# Patient Record
Sex: MALE | Race: WHITE | Employment: OTHER
[De-identification: names, ages, dates, MRNs, and addresses within clinical notes are randomized per-mention and may not be internally consistent; named-entity substitution may affect disease eponyms.]

---

## 2017-05-03 ENCOUNTER — SURGERY (OUTPATIENT)
Age: 72
End: 2017-05-03

## 2017-05-03 ENCOUNTER — HOSPITAL ENCOUNTER (OUTPATIENT)
Facility: HOSPITAL | Age: 72
Setting detail: HOSPITAL OUTPATIENT SURGERY
Discharge: HOME | End: 2017-05-03
Attending: INTERNAL MEDICINE | Admitting: INTERNAL MEDICINE
Payer: MEDICARE

## 2017-05-03 VITALS
TEMPERATURE: 98 F | OXYGEN SATURATION: 98 % | RESPIRATION RATE: 16 BRPM | HEART RATE: 60 BPM | DIASTOLIC BLOOD PRESSURE: 60 MMHG | SYSTOLIC BLOOD PRESSURE: 124 MMHG

## 2017-05-03 DIAGNOSIS — Z86.010 PERSONAL HISTORY OF COLONIC POLYPS: ICD-10-CM

## 2017-05-03 PROCEDURE — 0DBP8ZX EXCISION OF RECTUM, VIA NATURAL OR ARTIFICIAL OPENING ENDOSCOPIC, DIAGNOSTIC: ICD-10-PCS | Performed by: INTERNAL MEDICINE

## 2017-05-03 PROCEDURE — 0DBH8ZX EXCISION OF CECUM, VIA NATURAL OR ARTIFICIAL OPENING ENDOSCOPIC, DIAGNOSTIC: ICD-10-PCS | Performed by: INTERNAL MEDICINE

## 2017-05-03 PROCEDURE — 0DBN8ZX EXCISION OF SIGMOID COLON, VIA NATURAL OR ARTIFICIAL OPENING ENDOSCOPIC, DIAGNOSTIC: ICD-10-PCS | Performed by: INTERNAL MEDICINE

## 2017-05-03 PROCEDURE — 88305 TISSUE EXAM BY PATHOLOGIST: CPT | Performed by: INTERNAL MEDICINE

## 2017-05-03 RX ORDER — MIDAZOLAM HYDROCHLORIDE 1 MG/ML
INJECTION INTRAMUSCULAR; INTRAVENOUS
Status: DISCONTINUED | OUTPATIENT
Start: 2017-05-03 | End: 2017-05-03

## 2017-05-03 RX ORDER — SODIUM CHLORIDE, SODIUM LACTATE, POTASSIUM CHLORIDE, CALCIUM CHLORIDE 600; 310; 30; 20 MG/100ML; MG/100ML; MG/100ML; MG/100ML
INJECTION, SOLUTION INTRAVENOUS CONTINUOUS
Status: DISCONTINUED | OUTPATIENT
Start: 2017-05-03 | End: 2017-05-03

## 2017-05-03 NOTE — H&P
[facility]  Pre-procedure History and Physical      Vivian Dye Patient Status:  Hospital Outpatient Surgery    1945 MRN [id number]   [facility] ENDOSCOPY Attending Dragan Bello MD   Hosp Day #  PCP Billie Blank MD       5/3

## 2017-05-03 NOTE — OPERATIVE REPORT
[facility]  Colonoscopy Report      Celso Ek Patient Status:  Hospital Outpatient Surgery    1945 MRN [id number]   [facility] ENDOSCOPY Attending Gabi Rosa MD       DATE OF OPERATION: 5/3/2017     PREOPERATIVE DIAGNOSIS

## 2018-06-28 PROCEDURE — 81001 URINALYSIS AUTO W/SCOPE: CPT | Performed by: INTERNAL MEDICINE

## 2018-07-05 PROCEDURE — 81003 URINALYSIS AUTO W/O SCOPE: CPT | Performed by: INTERNAL MEDICINE

## 2018-07-20 PROBLEM — Z87.440 HISTORY OF UTI: Status: ACTIVE | Noted: 2018-07-20

## 2018-07-20 PROBLEM — R73.01 IFG (IMPAIRED FASTING GLUCOSE): Status: ACTIVE | Noted: 2018-07-20

## 2018-08-09 PROBLEM — I70.0 AORTIC ATHEROSCLEROSIS (HCC): Chronic | Status: ACTIVE | Noted: 2018-08-09

## 2018-08-09 PROBLEM — I70.0 AORTIC ATHEROSCLEROSIS (HCC): Status: ACTIVE | Noted: 2018-08-09

## 2018-08-20 PROCEDURE — 81003 URINALYSIS AUTO W/O SCOPE: CPT | Performed by: INTERNAL MEDICINE

## 2020-07-27 PROBLEM — N18.30 CKD (CHRONIC KIDNEY DISEASE) STAGE 3, GFR 30-59 ML/MIN (HCC): Status: ACTIVE | Noted: 2020-07-27

## 2021-08-11 PROBLEM — L60.0 ONYCHOCRYPTOSIS: Status: ACTIVE | Noted: 2020-07-16

## 2022-09-28 ENCOUNTER — OFFICE VISIT (OUTPATIENT)
Facility: LOCATION | Age: 77
End: 2022-09-28

## 2022-09-28 DIAGNOSIS — H61.23 BILATERAL IMPACTED CERUMEN: Primary | ICD-10-CM

## 2022-09-28 PROCEDURE — 92504 EAR MICROSCOPY EXAMINATION: CPT | Performed by: OTOLARYNGOLOGY

## 2022-09-28 PROCEDURE — 99214 OFFICE O/P EST MOD 30 MIN: CPT | Performed by: OTOLARYNGOLOGY

## 2023-01-01 ENCOUNTER — APPOINTMENT (OUTPATIENT)
Dept: CT IMAGING | Facility: HOSPITAL | Age: 78
DRG: 871 | End: 2023-01-01
Attending: EMERGENCY MEDICINE

## 2023-01-01 ENCOUNTER — HOSPITAL ENCOUNTER (INPATIENT)
Facility: HOSPITAL | Age: 78
LOS: 1 days | DRG: 871 | End: 2023-01-01
Attending: EMERGENCY MEDICINE | Admitting: INTERNAL MEDICINE

## 2023-01-01 ENCOUNTER — LAB ENCOUNTER (OUTPATIENT)
Dept: LAB | Facility: HOSPITAL | Age: 78
DRG: 371 | End: 2023-01-01
Attending: CLINICAL NURSE SPECIALIST

## 2023-01-01 ENCOUNTER — HOSPITAL ENCOUNTER (INPATIENT)
Facility: HOSPITAL | Age: 78
LOS: 6 days | Discharge: HOME | DRG: 371 | End: 2023-01-01
Attending: EMERGENCY MEDICINE | Admitting: INTERNAL MEDICINE

## 2023-01-01 ENCOUNTER — APPOINTMENT (OUTPATIENT)
Dept: GENERAL RADIOLOGY | Facility: HOSPITAL | Age: 78
DRG: 871 | End: 2023-01-01
Attending: EMERGENCY MEDICINE

## 2023-01-01 ENCOUNTER — TELEPHONE (OUTPATIENT)
Dept: HEMATOLOGY/ONCOLOGY | Facility: HOSPITAL | Age: 78
End: 2023-01-01

## 2023-01-01 ENCOUNTER — APPOINTMENT (OUTPATIENT)
Dept: HEMATOLOGY/ONCOLOGY | Facility: HOSPITAL | Age: 78
End: 2023-01-01
Attending: INTERNAL MEDICINE
Payer: MEDICARE

## 2023-01-01 ENCOUNTER — HOSPITAL ENCOUNTER (OUTPATIENT)
Dept: GENERAL RADIOLOGY | Facility: HOSPITAL | Age: 78
Discharge: HOME | End: 2023-01-01
Attending: CLINICAL NURSE SPECIALIST
Payer: MEDICARE

## 2023-01-01 VITALS
SYSTOLIC BLOOD PRESSURE: 118 MMHG | RESPIRATION RATE: 18 BRPM | WEIGHT: 209.88 LBS | TEMPERATURE: 98 F | HEART RATE: 79 BPM | OXYGEN SATURATION: 96 % | DIASTOLIC BLOOD PRESSURE: 51 MMHG | BODY MASS INDEX: 29 KG/M2

## 2023-01-01 VITALS
SYSTOLIC BLOOD PRESSURE: 116 MMHG | WEIGHT: 185.19 LBS | OXYGEN SATURATION: 50 % | HEIGHT: 74 IN | BODY MASS INDEX: 23.77 KG/M2 | DIASTOLIC BLOOD PRESSURE: 88 MMHG | TEMPERATURE: 96 F

## 2023-01-01 DIAGNOSIS — E83.51 HYPOCALCEMIA: ICD-10-CM

## 2023-01-01 DIAGNOSIS — E87.20 METABOLIC ACIDEMIA: ICD-10-CM

## 2023-01-01 DIAGNOSIS — R10.84 GENERALIZED ABDOMINAL PAIN: ICD-10-CM

## 2023-01-01 DIAGNOSIS — R10.31 RIGHT LOWER QUADRANT ABDOMINAL PAIN: ICD-10-CM

## 2023-01-01 DIAGNOSIS — A04.72 CLOSTRIDIUM DIFFICILE DIARRHEA: Primary | ICD-10-CM

## 2023-01-01 DIAGNOSIS — N17.9 ACUTE RENAL FAILURE SUPERIMPOSED ON CHRONIC KIDNEY DISEASE, UNSPECIFIED ACUTE RENAL FAILURE TYPE, UNSPECIFIED CKD STAGE: Primary | ICD-10-CM

## 2023-01-01 DIAGNOSIS — R19.7 DIARRHEA, UNSPECIFIED TYPE: ICD-10-CM

## 2023-01-01 DIAGNOSIS — C25.9 MALIGNANT NEOPLASM OF PANCREAS, UNSPECIFIED LOCATION OF MALIGNANCY (HCC): ICD-10-CM

## 2023-01-01 DIAGNOSIS — C25.2 MALIGNANT NEOPLASM OF TAIL OF PANCREAS (HCC): ICD-10-CM

## 2023-01-01 DIAGNOSIS — A04.72 CLOSTRIDIUM DIFFICILE COLITIS: ICD-10-CM

## 2023-01-01 DIAGNOSIS — R53.1 WEAKNESS GENERALIZED: ICD-10-CM

## 2023-01-01 DIAGNOSIS — N18.9 ACUTE RENAL FAILURE SUPERIMPOSED ON CHRONIC KIDNEY DISEASE, UNSPECIFIED ACUTE RENAL FAILURE TYPE, UNSPECIFIED CKD STAGE: Primary | ICD-10-CM

## 2023-01-01 LAB
ALBUMIN SERPL-MCNC: 1.6 G/DL (ref 3.4–5)
ALBUMIN SERPL-MCNC: 2 G/DL (ref 3.4–5)
ALBUMIN SERPL-MCNC: 2 G/DL (ref 3.4–5)
ALBUMIN SERPL-MCNC: 2.5 G/DL (ref 3.4–5)
ALBUMIN/GLOB SERPL: 0.5 {RATIO} (ref 1–2)
ALBUMIN/GLOB SERPL: 0.6 {RATIO} (ref 1–2)
ALBUMIN/GLOB SERPL: 0.7 {RATIO} (ref 1–2)
ALP LIVER SERPL-CCNC: 134 U/L
ALP LIVER SERPL-CCNC: 75 U/L
ALP LIVER SERPL-CCNC: 99 U/L
ALT SERPL-CCNC: 126 U/L
ALT SERPL-CCNC: 37 U/L
ALT SERPL-CCNC: 42 U/L
ANION GAP SERPL CALC-SCNC: 18 MMOL/L (ref 0–18)
ANION GAP SERPL CALC-SCNC: 21 MMOL/L (ref 0–18)
ANION GAP SERPL CALC-SCNC: 5 MMOL/L (ref 0–18)
ANION GAP SERPL CALC-SCNC: 5 MMOL/L (ref 0–18)
ANION GAP SERPL CALC-SCNC: 7 MMOL/L (ref 0–18)
ANION GAP SERPL CALC-SCNC: 8 MMOL/L (ref 0–18)
ANION GAP SERPL CALC-SCNC: 9 MMOL/L (ref 0–18)
ANION GAP SERPL CALC-SCNC: 9 MMOL/L (ref 0–18)
APTT PPP: 43.7 SECONDS (ref 23.3–35.6)
ARTERIAL PATENCY WRIST A: POSITIVE
ARTERIAL PATENCY WRIST A: POSITIVE
AST SERPL-CCNC: 137 U/L (ref 15–37)
AST SERPL-CCNC: 23 U/L (ref 15–37)
AST SERPL-CCNC: 34 U/L (ref 15–37)
ATRIAL RATE: 103 BPM
BASE EXCESS BLDA CALC-SCNC: -22.4 MMOL/L (ref ?–2)
BASE EXCESS BLDA CALC-SCNC: -22.6 MMOL/L (ref ?–2)
BASOPHILS # BLD AUTO: 0.01 X10(3) UL (ref 0–0.2)
BASOPHILS # BLD AUTO: 0.01 X10(3) UL (ref 0–0.2)
BASOPHILS # BLD AUTO: 0.02 X10(3) UL (ref 0–0.2)
BASOPHILS # BLD: 0 X10(3) UL (ref 0–0.2)
BASOPHILS # BLD: 0.16 X10(3) UL (ref 0–0.2)
BASOPHILS NFR BLD AUTO: 0.6 %
BASOPHILS NFR BLD AUTO: 1.5 %
BASOPHILS NFR BLD AUTO: 2 %
BASOPHILS NFR BLD: 0 %
BASOPHILS NFR BLD: 1 %
BILIRUB SERPL-MCNC: 0.8 MG/DL (ref 0.1–2)
BILIRUB SERPL-MCNC: 1.2 MG/DL (ref 0.1–2)
BILIRUB SERPL-MCNC: 1.3 MG/DL (ref 0.1–2)
BILIRUB UR QL CFM: NEGATIVE
BILIRUB UR QL STRIP.AUTO: NEGATIVE
BODY TEMPERATURE: 98.6 F
BODY TEMPERATURE: 98.6 F
BUN BLD-MCNC: 18 MG/DL (ref 7–18)
BUN BLD-MCNC: 22 MG/DL (ref 7–18)
BUN BLD-MCNC: 24 MG/DL (ref 7–18)
BUN BLD-MCNC: 24 MG/DL (ref 7–18)
BUN BLD-MCNC: 25 MG/DL (ref 7–18)
BUN BLD-MCNC: 27 MG/DL (ref 7–18)
BUN BLD-MCNC: 44 MG/DL (ref 7–18)
BUN BLD-MCNC: 47 MG/DL (ref 7–18)
C DIFF TOX B STL QL: POSITIVE
CA-I BLD-SCNC: 0.97 MMOL/L (ref 0.95–1.32)
CA-I BLD-SCNC: 1.04 MMOL/L (ref 0.95–1.32)
CALCIUM BLD-MCNC: 7.1 MG/DL (ref 8.5–10.1)
CALCIUM BLD-MCNC: 7.4 MG/DL (ref 8.5–10.1)
CALCIUM BLD-MCNC: 7.5 MG/DL (ref 8.5–10.1)
CALCIUM BLD-MCNC: 7.5 MG/DL (ref 8.5–10.1)
CALCIUM BLD-MCNC: 7.7 MG/DL (ref 8.5–10.1)
CALCIUM BLD-MCNC: 7.7 MG/DL (ref 8.5–10.1)
CALCIUM BLD-MCNC: 7.9 MG/DL (ref 8.5–10.1)
CALCIUM BLD-MCNC: 8.3 MG/DL (ref 8.5–10.1)
CHLORIDE SERPL-SCNC: 107 MMOL/L (ref 98–112)
CHLORIDE SERPL-SCNC: 109 MMOL/L (ref 98–112)
CHLORIDE SERPL-SCNC: 111 MMOL/L (ref 98–112)
CHLORIDE SERPL-SCNC: 112 MMOL/L (ref 98–112)
CHLORIDE SERPL-SCNC: 113 MMOL/L (ref 98–112)
CHLORIDE SERPL-SCNC: 114 MMOL/L (ref 98–112)
CHLORIDE SERPL-SCNC: 114 MMOL/L (ref 98–112)
CHLORIDE SERPL-SCNC: 115 MMOL/L (ref 98–112)
CLARITY UR REFRACT.AUTO: CLEAR
CO2 SERPL-SCNC: 18 MMOL/L (ref 21–32)
CO2 SERPL-SCNC: 20 MMOL/L (ref 21–32)
CO2 SERPL-SCNC: 20 MMOL/L (ref 21–32)
CO2 SERPL-SCNC: 21 MMOL/L (ref 21–32)
CO2 SERPL-SCNC: 21 MMOL/L (ref 21–32)
CO2 SERPL-SCNC: 24 MMOL/L (ref 21–32)
CO2 SERPL-SCNC: 7 MMOL/L (ref 21–32)
CO2 SERPL-SCNC: 9 MMOL/L (ref 21–32)
COHGB MFR BLD: 1.3 % SAT (ref 0–3)
COHGB MFR BLD: 1.4 % SAT (ref 0–3)
COLOR UR AUTO: YELLOW
COLOR UR AUTO: YELLOW
CREAT BLD-MCNC: 0.93 MG/DL
CREAT BLD-MCNC: 1.12 MG/DL
CREAT BLD-MCNC: 1.13 MG/DL
CREAT BLD-MCNC: 1.17 MG/DL
CREAT BLD-MCNC: 1.27 MG/DL
CREAT BLD-MCNC: 1.31 MG/DL
CREAT BLD-MCNC: 3.68 MG/DL
CREAT BLD-MCNC: 3.73 MG/DL
CREAT UR-SCNC: 220 MG/DL
DOHLE BOD BLD QL SMEAR: PRESENT
DOHLE BOD BLD QL SMEAR: PRESENT
EGFRCR SERPLBLD CKD-EPI 2021: 16 ML/MIN/1.73M2 (ref 60–?)
EGFRCR SERPLBLD CKD-EPI 2021: 16 ML/MIN/1.73M2 (ref 60–?)
EGFRCR SERPLBLD CKD-EPI 2021: 55 ML/MIN/1.73M2 (ref 60–?)
EGFRCR SERPLBLD CKD-EPI 2021: 57 ML/MIN/1.73M2 (ref 60–?)
EGFRCR SERPLBLD CKD-EPI 2021: 63 ML/MIN/1.73M2 (ref 60–?)
EGFRCR SERPLBLD CKD-EPI 2021: 66 ML/MIN/1.73M2 (ref 60–?)
EGFRCR SERPLBLD CKD-EPI 2021: 66 ML/MIN/1.73M2 (ref 60–?)
EGFRCR SERPLBLD CKD-EPI 2021: 83 ML/MIN/1.73M2 (ref 60–?)
EOSINOPHIL # BLD AUTO: 0.01 X10(3) UL (ref 0–0.7)
EOSINOPHIL # BLD AUTO: 0.03 X10(3) UL (ref 0–0.7)
EOSINOPHIL # BLD AUTO: 0.04 X10(3) UL (ref 0–0.7)
EOSINOPHIL # BLD: 0 X10(3) UL (ref 0–0.7)
EOSINOPHIL # BLD: 0.26 X10(3) UL (ref 0–0.7)
EOSINOPHIL # BLD: 0.32 X10(3) UL (ref 0–0.7)
EOSINOPHIL # BLD: 0.33 X10(3) UL (ref 0–0.7)
EOSINOPHIL NFR BLD AUTO: 0.6 %
EOSINOPHIL NFR BLD AUTO: 3 %
EOSINOPHIL NFR BLD AUTO: 6.2 %
EOSINOPHIL NFR BLD: 0 %
EOSINOPHIL NFR BLD: 1 %
EOSINOPHIL NFR BLD: 2 %
EOSINOPHIL NFR BLD: 6 %
ERYTHROCYTE [DISTWIDTH] IN BLOOD BY AUTOMATED COUNT: 14.1 %
ERYTHROCYTE [DISTWIDTH] IN BLOOD BY AUTOMATED COUNT: 14.6 %
ERYTHROCYTE [DISTWIDTH] IN BLOOD BY AUTOMATED COUNT: 14.9 %
ERYTHROCYTE [DISTWIDTH] IN BLOOD BY AUTOMATED COUNT: 15.2 %
ERYTHROCYTE [DISTWIDTH] IN BLOOD BY AUTOMATED COUNT: 15.7 %
ERYTHROCYTE [DISTWIDTH] IN BLOOD BY AUTOMATED COUNT: 15.9 %
ERYTHROCYTE [DISTWIDTH] IN BLOOD BY AUTOMATED COUNT: 19.4 %
FIO2: 32 %
FLUAV + FLUBV RNA SPEC NAA+PROBE: NEGATIVE
FLUAV + FLUBV RNA SPEC NAA+PROBE: NEGATIVE
GLOBULIN PLAS-MCNC: 3.2 G/DL (ref 2.8–4.4)
GLOBULIN PLAS-MCNC: 3.8 G/DL (ref 2.8–4.4)
GLOBULIN PLAS-MCNC: 4 G/DL (ref 2.8–4.4)
GLUCOSE BLD-MCNC: 102 MG/DL (ref 70–99)
GLUCOSE BLD-MCNC: 103 MG/DL (ref 70–99)
GLUCOSE BLD-MCNC: 106 MG/DL (ref 70–99)
GLUCOSE BLD-MCNC: 106 MG/DL (ref 70–99)
GLUCOSE BLD-MCNC: 107 MG/DL (ref 70–99)
GLUCOSE BLD-MCNC: 107 MG/DL (ref 70–99)
GLUCOSE BLD-MCNC: 109 MG/DL (ref 70–99)
GLUCOSE BLD-MCNC: 109 MG/DL (ref 70–99)
GLUCOSE BLD-MCNC: 110 MG/DL (ref 70–99)
GLUCOSE BLD-MCNC: 111 MG/DL (ref 70–99)
GLUCOSE BLD-MCNC: 112 MG/DL (ref 70–99)
GLUCOSE BLD-MCNC: 112 MG/DL (ref 70–99)
GLUCOSE BLD-MCNC: 114 MG/DL (ref 70–99)
GLUCOSE BLD-MCNC: 116 MG/DL (ref 70–99)
GLUCOSE BLD-MCNC: 120 MG/DL (ref 70–99)
GLUCOSE BLD-MCNC: 123 MG/DL (ref 70–99)
GLUCOSE BLD-MCNC: 126 MG/DL (ref 70–99)
GLUCOSE BLD-MCNC: 127 MG/DL (ref 70–99)
GLUCOSE BLD-MCNC: 128 MG/DL (ref 70–99)
GLUCOSE BLD-MCNC: 130 MG/DL (ref 70–99)
GLUCOSE BLD-MCNC: 136 MG/DL (ref 70–99)
GLUCOSE BLD-MCNC: 136 MG/DL (ref 70–99)
GLUCOSE BLD-MCNC: 138 MG/DL (ref 70–99)
GLUCOSE BLD-MCNC: 139 MG/DL (ref 70–99)
GLUCOSE BLD-MCNC: 141 MG/DL (ref 70–99)
GLUCOSE BLD-MCNC: 141 MG/DL (ref 70–99)
GLUCOSE BLD-MCNC: 144 MG/DL (ref 70–99)
GLUCOSE BLD-MCNC: 147 MG/DL (ref 70–99)
GLUCOSE BLD-MCNC: 148 MG/DL (ref 70–99)
GLUCOSE BLD-MCNC: 155 MG/DL (ref 70–99)
GLUCOSE BLD-MCNC: 65 MG/DL (ref 70–99)
GLUCOSE BLD-MCNC: 66 MG/DL (ref 70–99)
GLUCOSE BLD-MCNC: 67 MG/DL (ref 70–99)
GLUCOSE BLD-MCNC: 89 MG/DL (ref 70–99)
GLUCOSE BLD-MCNC: 96 MG/DL (ref 70–99)
GLUCOSE BLD-MCNC: 96 MG/DL (ref 70–99)
GLUCOSE UR STRIP.AUTO-MCNC: NORMAL MG/DL
GLUCOSE UR STRIP.AUTO-MCNC: NORMAL MG/DL
HCO3 BLDA-SCNC: 7.2 MEQ/L (ref 21–27)
HCO3 BLDA-SCNC: 7.4 MEQ/L (ref 21–27)
HCT VFR BLD AUTO: 26.3 %
HCT VFR BLD AUTO: 26.3 %
HCT VFR BLD AUTO: 26.5 %
HCT VFR BLD AUTO: 26.7 %
HCT VFR BLD AUTO: 27.3 %
HCT VFR BLD AUTO: 33.8 %
HCT VFR BLD AUTO: 36.2 %
HGB BLD-MCNC: 11.6 G/DL
HGB BLD-MCNC: 11.6 G/DL
HGB BLD-MCNC: 11.7 G/DL
HGB BLD-MCNC: 9.3 G/DL
HGB BLD-MCNC: 9.4 G/DL
HGB BLD-MCNC: 9.4 G/DL
HGB BLD-MCNC: 9.5 G/DL
HGB BLD-MCNC: 9.6 G/DL
HGB BLD-MCNC: 9.7 G/DL
HGBA1C: 6.2 %
IMM GRANULOCYTES # BLD AUTO: 0.01 X10(3) UL (ref 0–1)
IMM GRANULOCYTES # BLD AUTO: 0.01 X10(3) UL (ref 0–1)
IMM GRANULOCYTES # BLD AUTO: 0.02 X10(3) UL (ref 0–1)
IMM GRANULOCYTES NFR BLD: 1 %
IMM GRANULOCYTES NFR BLD: 1.1 %
IMM GRANULOCYTES NFR BLD: 1.5 %
INR BLD: 1.74 (ref 0.85–1.16)
KETONES UR STRIP.AUTO-MCNC: NEGATIVE MG/DL
KETONES UR STRIP.AUTO-MCNC: NEGATIVE MG/DL
L/M: 3 L/MIN
L/M: 3 L/MIN
LACTATE BLD-SCNC: 11.5 MMOL/L (ref 0.5–2)
LACTATE BLD-SCNC: 9.9 MMOL/L (ref 0.5–2)
LACTATE SERPL-SCNC: 10.6 MMOL/L (ref 0.4–2)
LACTATE SERPL-SCNC: 12.6 MMOL/L (ref 0.4–2)
LACTATE SERPL-SCNC: 9.1 MMOL/L (ref 0.4–2)
LEUKOCYTE ESTERASE UR QL STRIP.AUTO: NEGATIVE
LEUKOCYTE ESTERASE UR QL STRIP.AUTO: NEGATIVE
LIPASE SERPL-CCNC: 21 U/L (ref 13–75)
LYMPHOCYTES # BLD AUTO: 0.19 X10(3) UL (ref 1–4)
LYMPHOCYTES # BLD AUTO: 0.35 X10(3) UL (ref 1–4)
LYMPHOCYTES # BLD AUTO: 0.37 X10(3) UL (ref 1–4)
LYMPHOCYTES NFR BLD AUTO: 21.1 %
LYMPHOCYTES NFR BLD AUTO: 29.2 %
LYMPHOCYTES NFR BLD AUTO: 35.4 %
LYMPHOCYTES NFR BLD: 0.99 X10(3) UL (ref 1–4)
LYMPHOCYTES NFR BLD: 1.57 X10(3) UL (ref 1–4)
LYMPHOCYTES NFR BLD: 1.77 X10(3) UL (ref 1–4)
LYMPHOCYTES NFR BLD: 10 %
LYMPHOCYTES NFR BLD: 11 %
LYMPHOCYTES NFR BLD: 18 %
LYMPHOCYTES NFR BLD: 2.32 X10(3) UL (ref 1–4)
LYMPHOCYTES NFR BLD: 6 %
MAGNESIUM SERPL-MCNC: 1.8 MG/DL (ref 1.6–2.6)
MAGNESIUM SERPL-MCNC: 2 MG/DL (ref 1.6–2.6)
MCH RBC QN AUTO: 32.6 PG (ref 26–34)
MCH RBC QN AUTO: 32.8 PG (ref 26–34)
MCH RBC QN AUTO: 32.9 PG (ref 26–34)
MCH RBC QN AUTO: 33 PG (ref 26–34)
MCH RBC QN AUTO: 33.4 PG (ref 26–34)
MCHC RBC AUTO-ENTMCNC: 32 G/DL (ref 31–37)
MCHC RBC AUTO-ENTMCNC: 34.4 G/DL (ref 31–37)
MCHC RBC AUTO-ENTMCNC: 34.6 G/DL (ref 31–37)
MCHC RBC AUTO-ENTMCNC: 35.4 G/DL (ref 31–37)
MCHC RBC AUTO-ENTMCNC: 35.5 G/DL (ref 31–37)
MCHC RBC AUTO-ENTMCNC: 36 G/DL (ref 31–37)
MCHC RBC AUTO-ENTMCNC: 36.1 G/DL (ref 31–37)
MCV RBC AUTO: 101.7 FL
MCV RBC AUTO: 90.4 FL
MCV RBC AUTO: 92 FL
MCV RBC AUTO: 93 FL
MCV RBC AUTO: 93.3 FL
MCV RBC AUTO: 94.7 FL
MCV RBC AUTO: 95.5 FL
METAMYELOCYTES # BLD: 0.17 X10(3) UL
METAMYELOCYTES # BLD: 0.46 X10(3) UL
METAMYELOCYTES # BLD: 0.97 X10(3) UL
METAMYELOCYTES # BLD: 1.31 X10(3) UL
METAMYELOCYTES NFR BLD: 2 %
METAMYELOCYTES NFR BLD: 3 %
METAMYELOCYTES NFR BLD: 5 %
METAMYELOCYTES NFR BLD: 6 %
METHGB MFR BLD: 0 % SAT (ref 0.4–1.5)
METHGB MFR BLD: 0 % SAT (ref 0.4–1.5)
MONOCYTES # BLD AUTO: 0.05 X10(3) UL (ref 0.1–1)
MONOCYTES # BLD AUTO: 0.18 X10(3) UL (ref 0.1–1)
MONOCYTES # BLD AUTO: 0.54 X10(3) UL (ref 0.1–1)
MONOCYTES # BLD: 0.77 X10(3) UL (ref 0.1–1)
MONOCYTES # BLD: 0.93 X10(3) UL (ref 0.1–1)
MONOCYTES # BLD: 1.04 X10(3) UL (ref 0.1–1)
MONOCYTES # BLD: 1.13 X10(3) UL (ref 0.1–1)
MONOCYTES NFR BLD AUTO: 27.7 %
MONOCYTES NFR BLD AUTO: 30.9 %
MONOCYTES NFR BLD AUTO: 5.1 %
MONOCYTES NFR BLD: 14 %
MONOCYTES NFR BLD: 4 %
MONOCYTES NFR BLD: 4 %
MONOCYTES NFR BLD: 7 %
MORPHOLOGY: NORMAL
MYELOCYTES # BLD: 0.11 X10(3) UL
MYELOCYTES # BLD: 0.48 X10(3) UL
MYELOCYTES # BLD: 0.52 X10(3) UL
MYELOCYTES # BLD: 0.7 X10(3) UL
MYELOCYTES NFR BLD: 2 %
MYELOCYTES NFR BLD: 2 %
MYELOCYTES NFR BLD: 3 %
MYELOCYTES NFR BLD: 3 %
NEUTROPHILS # BLD AUTO: 0.22 X10 (3) UL (ref 1.5–7.7)
NEUTROPHILS # BLD AUTO: 0.22 X10(3) UL (ref 1.5–7.7)
NEUTROPHILS # BLD AUTO: 0.53 X10 (3) UL (ref 1.5–7.7)
NEUTROPHILS # BLD AUTO: 0.53 X10(3) UL (ref 1.5–7.7)
NEUTROPHILS # BLD AUTO: 0.8 X10 (3) UL (ref 1.5–7.7)
NEUTROPHILS # BLD AUTO: 0.8 X10(3) UL (ref 1.5–7.7)
NEUTROPHILS # BLD AUTO: 10.76 X10 (3) UL (ref 1.5–7.7)
NEUTROPHILS # BLD AUTO: 17.88 X10 (3) UL (ref 1.5–7.7)
NEUTROPHILS # BLD AUTO: 19.07 X10 (3) UL (ref 1.5–7.7)
NEUTROPHILS # BLD AUTO: 3.45 X10 (3) UL (ref 1.5–7.7)
NEUTROPHILS NFR BLD AUTO: 33.9 %
NEUTROPHILS NFR BLD AUTO: 45.7 %
NEUTROPHILS NFR BLD AUTO: 53.5 %
NEUTROPHILS NFR BLD: 23 %
NEUTROPHILS NFR BLD: 43 %
NEUTROPHILS NFR BLD: 57 %
NEUTROPHILS NFR BLD: 68 %
NEUTS BAND NFR BLD: 13 %
NEUTS BAND NFR BLD: 14 %
NEUTS BAND NFR BLD: 14 %
NEUTS BAND NFR BLD: 58 %
NEUTS HYPERSEG # BLD: 11.27 X10(3) UL (ref 1.5–7.7)
NEUTS HYPERSEG # BLD: 18.79 X10(3) UL (ref 1.5–7.7)
NEUTS HYPERSEG # BLD: 21.4 X10(3) UL (ref 1.5–7.7)
NEUTS HYPERSEG # BLD: 3.14 X10(3) UL (ref 1.5–7.7)
NITRITE UR QL STRIP.AUTO: NEGATIVE
NITRITE UR QL STRIP.AUTO: NEGATIVE
NRBC BLD MANUAL-RTO: 1 %
NRBC BLD MANUAL-RTO: 3 %
NRBC BLD MANUAL-RTO: 4 %
OSMOLALITY SERPL CALC.SUM OF ELEC: 288 MOSM/KG (ref 275–295)
OSMOLALITY SERPL CALC.SUM OF ELEC: 293 MOSM/KG (ref 275–295)
OSMOLALITY SERPL CALC.SUM OF ELEC: 293 MOSM/KG (ref 275–295)
OSMOLALITY SERPL CALC.SUM OF ELEC: 294 MOSM/KG (ref 275–295)
OSMOLALITY SERPL CALC.SUM OF ELEC: 295 MOSM/KG (ref 275–295)
OSMOLALITY SERPL CALC.SUM OF ELEC: 296 MOSM/KG (ref 275–295)
OSMOLALITY SERPL CALC.SUM OF ELEC: 299 MOSM/KG (ref 275–295)
OSMOLALITY SERPL CALC.SUM OF ELEC: 303 MOSM/KG (ref 275–295)
OXYHGB MFR BLDA: 90.5 % (ref 92–100)
OXYHGB MFR BLDA: 93.3 % (ref 92–100)
P AXIS: 22 DEGREES
P-R INTERVAL: 156 MS
PCO2 BLDA: 17 MM HG (ref 35–45)
PCO2 BLDA: 25 MM HG (ref 35–45)
PH BLDA: 7.03 [PH] (ref 7.35–7.45)
PH BLDA: 7.1 [PH] (ref 7.35–7.45)
PH UR STRIP.AUTO: 5.5 [PH] (ref 5–8)
PH UR STRIP.AUTO: 5.5 [PH] (ref 5–8)
PHOSPHATE SERPL-MCNC: 10.5 MG/DL (ref 2.5–4.9)
PLATELET # BLD AUTO: 103 10(3)UL (ref 150–450)
PLATELET # BLD AUTO: 123 10(3)UL (ref 150–450)
PLATELET # BLD AUTO: 210 10(3)UL (ref 150–450)
PLATELET # BLD AUTO: 69 10(3)UL (ref 150–450)
PLATELET # BLD AUTO: 69 10(3)UL (ref 150–450)
PLATELET # BLD AUTO: 78 10(3)UL (ref 150–450)
PLATELET # BLD AUTO: 94 10(3)UL (ref 150–450)
PLATELET MORPHOLOGY: NORMAL
PO2 BLDA: 66 MM HG (ref 80–100)
PO2 BLDA: 81 MM HG (ref 80–100)
POTASSIUM BLD-SCNC: 5.1 MMOL/L (ref 3.6–5.1)
POTASSIUM BLD-SCNC: 5.2 MMOL/L (ref 3.6–5.1)
POTASSIUM SERPL-SCNC: 3 MMOL/L (ref 3.5–5.1)
POTASSIUM SERPL-SCNC: 3.1 MMOL/L (ref 3.5–5.1)
POTASSIUM SERPL-SCNC: 3.1 MMOL/L (ref 3.5–5.1)
POTASSIUM SERPL-SCNC: 3.4 MMOL/L (ref 3.5–5.1)
POTASSIUM SERPL-SCNC: 3.4 MMOL/L (ref 3.5–5.1)
POTASSIUM SERPL-SCNC: 3.6 MMOL/L (ref 3.5–5.1)
POTASSIUM SERPL-SCNC: 4.6 MMOL/L (ref 3.5–5.1)
POTASSIUM SERPL-SCNC: 5.5 MMOL/L (ref 3.5–5.1)
PROT SERPL-MCNC: 5.2 G/DL (ref 6.4–8.2)
PROT SERPL-MCNC: 6 G/DL (ref 6.4–8.2)
PROT SERPL-MCNC: 6.3 G/DL (ref 6.4–8.2)
PROT UR STRIP.AUTO-MCNC: 70 MG/DL
PROT UR STRIP.AUTO-MCNC: 70 MG/DL
PROTHROMBIN TIME: 20.5 SECONDS (ref 11.6–14.8)
Q-T INTERVAL: 324 MS
QRS DURATION: 76 MS
QTC CALCULATION (BEZET): 424 MS
R AXIS: 40 DEGREES
RBC # BLD AUTO: 2.82 X10(6)UL
RBC # BLD AUTO: 2.86 X10(6)UL
RBC # BLD AUTO: 2.87 X10(6)UL
RBC # BLD AUTO: 2.88 X10(6)UL
RBC # BLD AUTO: 2.91 X10(6)UL
RBC # BLD AUTO: 3.56 X10(6)UL
RBC # BLD AUTO: 3.57 X10(6)UL
RBC UR QL AUTO: NEGATIVE
RBC UR QL AUTO: NEGATIVE
RSV RNA SPEC NAA+PROBE: NEGATIVE
SARS-COV-2 RNA RESP QL NAA+PROBE: NOT DETECTED
SARS-COV-2 RNA RESP QL NAA+PROBE: NOT DETECTED
SODIUM BLD-SCNC: 134 MMOL/L (ref 135–145)
SODIUM BLD-SCNC: 137 MMOL/L (ref 135–145)
SODIUM SERPL-SCNC: 10 MMOL/L
SODIUM SERPL-SCNC: 136 MMOL/L (ref 136–145)
SODIUM SERPL-SCNC: 139 MMOL/L (ref 136–145)
SODIUM SERPL-SCNC: 140 MMOL/L (ref 136–145)
SODIUM SERPL-SCNC: 141 MMOL/L (ref 136–145)
SODIUM SERPL-SCNC: 141 MMOL/L (ref 136–145)
SP GR UR STRIP.AUTO: 1.03 (ref 1–1.03)
SP GR UR STRIP.AUTO: 1.03 (ref 1–1.03)
T AXIS: 50 DEGREES
TOTAL CELLS COUNTED BLD: 100
UROBILINOGEN UR STRIP.AUTO-MCNC: 2 MG/DL
UROBILINOGEN UR STRIP.AUTO-MCNC: NORMAL MG/DL
VENTRICULAR RATE: 103 BPM
WBC # BLD AUTO: 0.7 X10(3) UL (ref 4–11)
WBC # BLD AUTO: 1 X10(3) UL (ref 4–11)
WBC # BLD AUTO: 1.8 X10(3) UL (ref 4–11)
WBC # BLD AUTO: 16.1 X10(3) UL (ref 4–11)
WBC # BLD AUTO: 23.2 X10(3) UL (ref 4–11)
WBC # BLD AUTO: 26.1 X10(3) UL (ref 4–11)
WBC # BLD AUTO: 5.5 X10(3) UL (ref 4–11)
YEAST UR QL: PRESENT /HPF

## 2023-01-01 PROCEDURE — 87046 STOOL CULTR AEROBIC BACT EA: CPT

## 2023-01-01 PROCEDURE — 99232 SBSQ HOSP IP/OBS MODERATE 35: CPT | Performed by: INTERNAL MEDICINE

## 2023-01-01 PROCEDURE — 74176 CT ABD & PELVIS W/O CONTRAST: CPT | Performed by: EMERGENCY MEDICINE

## 2023-01-01 PROCEDURE — 71045 X-RAY EXAM CHEST 1 VIEW: CPT | Performed by: EMERGENCY MEDICINE

## 2023-01-01 PROCEDURE — 87045 FECES CULTURE AEROBIC BACT: CPT

## 2023-01-01 PROCEDURE — 99223 1ST HOSP IP/OBS HIGH 75: CPT | Performed by: SPECIALIST

## 2023-01-01 PROCEDURE — 5A09357 ASSISTANCE WITH RESPIRATORY VENTILATION, LESS THAN 24 CONSECUTIVE HOURS, CONTINUOUS POSITIVE AIRWAY PRESSURE: ICD-10-PCS | Performed by: INTERNAL MEDICINE

## 2023-01-01 PROCEDURE — 87427 SHIGA-LIKE TOXIN AG IA: CPT

## 2023-01-01 PROCEDURE — 87493 C DIFF AMPLIFIED PROBE: CPT

## 2023-01-01 PROCEDURE — 74019 RADEX ABDOMEN 2 VIEWS: CPT | Performed by: CLINICAL NURSE SPECIALIST

## 2023-01-01 PROCEDURE — 99291 CRITICAL CARE FIRST HOUR: CPT | Performed by: INTERNAL MEDICINE

## 2023-01-01 PROCEDURE — 99223 1ST HOSP IP/OBS HIGH 75: CPT | Performed by: INTERNAL MEDICINE

## 2023-01-01 PROCEDURE — 99223 1ST HOSP IP/OBS HIGH 75: CPT | Performed by: NURSE PRACTITIONER

## 2023-01-01 RX ORDER — ACETAMINOPHEN 325 MG/1
650 TABLET ORAL EVERY 6 HOURS PRN
Status: DISCONTINUED | OUTPATIENT
Start: 2023-01-01 | End: 2023-10-24

## 2023-01-01 RX ORDER — POTASSIUM CHLORIDE 20 MEQ/1
40 TABLET, EXTENDED RELEASE ORAL ONCE
Status: COMPLETED | OUTPATIENT
Start: 2023-01-01 | End: 2023-01-01

## 2023-01-01 RX ORDER — BISACODYL 10 MG
10 SUPPOSITORY, RECTAL RECTAL
Status: DISCONTINUED | OUTPATIENT
Start: 2023-01-01 | End: 2023-01-01

## 2023-01-01 RX ORDER — MORPHINE SULFATE 4 MG/ML
6 INJECTION, SOLUTION INTRAMUSCULAR; INTRAVENOUS
Status: DISCONTINUED | OUTPATIENT
Start: 2023-01-01 | End: 2023-10-24

## 2023-01-01 RX ORDER — SODIUM CHLORIDE 9 MG/ML
INJECTION, SOLUTION INTRAVENOUS CONTINUOUS
Status: DISCONTINUED | OUTPATIENT
Start: 2023-01-01 | End: 2023-01-01

## 2023-01-01 RX ORDER — ONDANSETRON 2 MG/ML
4 INJECTION INTRAMUSCULAR; INTRAVENOUS EVERY 6 HOURS PRN
Status: DISCONTINUED | OUTPATIENT
Start: 2023-01-01 | End: 2023-01-01

## 2023-01-01 RX ORDER — MORPHINE SULFATE 4 MG/ML
4 INJECTION, SOLUTION INTRAMUSCULAR; INTRAVENOUS
Status: DISCONTINUED | OUTPATIENT
Start: 2023-01-01 | End: 2023-10-24

## 2023-01-01 RX ORDER — HEPARIN SODIUM 5000 [USP'U]/ML
5000 INJECTION, SOLUTION INTRAVENOUS; SUBCUTANEOUS EVERY 8 HOURS SCHEDULED
Status: DISCONTINUED | OUTPATIENT
Start: 2023-01-01 | End: 2023-01-01

## 2023-01-01 RX ORDER — HYDROMORPHONE HYDROCHLORIDE 2 MG/1
2 TABLET ORAL EVERY 2 HOUR PRN
Status: DISCONTINUED | OUTPATIENT
Start: 2023-01-01 | End: 2023-10-24

## 2023-01-01 RX ORDER — METOCLOPRAMIDE HYDROCHLORIDE 5 MG/ML
10 INJECTION INTRAMUSCULAR; INTRAVENOUS EVERY 8 HOURS PRN
Status: DISCONTINUED | OUTPATIENT
Start: 2023-01-01 | End: 2023-01-01

## 2023-01-01 RX ORDER — SODIUM CHLORIDE 9 MG/ML
INJECTION, SOLUTION INTRAVENOUS CONTINUOUS
Status: ACTIVE | OUTPATIENT
Start: 2023-01-01 | End: 2023-01-01

## 2023-01-01 RX ORDER — POTASSIUM CHLORIDE 20 MEQ/1
40 TABLET, EXTENDED RELEASE ORAL EVERY 4 HOURS
Status: DISPENSED | OUTPATIENT
Start: 2023-01-01 | End: 2023-01-01

## 2023-01-01 RX ORDER — ONDANSETRON 4 MG/1
4 TABLET, ORALLY DISINTEGRATING ORAL EVERY 6 HOURS PRN
Status: DISCONTINUED | OUTPATIENT
Start: 2023-01-01 | End: 2023-10-24

## 2023-01-01 RX ORDER — DEXTROSE MONOHYDRATE 25 G/50ML
INJECTION, SOLUTION INTRAVENOUS
Status: COMPLETED
Start: 2023-01-01 | End: 2023-01-01

## 2023-01-01 RX ORDER — NICOTINE POLACRILEX 4 MG
30 LOZENGE BUCCAL
Status: DISCONTINUED | OUTPATIENT
Start: 2023-01-01 | End: 2023-01-01

## 2023-01-01 RX ORDER — DICYCLOMINE HCL 20 MG
20 TABLET ORAL 4 TIMES DAILY PRN
Status: DISCONTINUED | OUTPATIENT
Start: 2023-01-01 | End: 2023-01-01

## 2023-01-01 RX ORDER — HYDROCODONE BITARTRATE AND ACETAMINOPHEN 5; 325 MG/1; MG/1
1 TABLET ORAL EVERY 4 HOURS PRN
Status: DISCONTINUED | OUTPATIENT
Start: 2023-01-01 | End: 2023-01-01

## 2023-01-01 RX ORDER — VANCOMYCIN HYDROCHLORIDE 125 MG/1
125 CAPSULE ORAL 4 TIMES DAILY
Qty: 40 CAPSULE | Refills: 0 | Status: SHIPPED | OUTPATIENT
Start: 2023-01-01 | End: 2023-10-24

## 2023-01-01 RX ORDER — HYDROCODONE BITARTRATE AND ACETAMINOPHEN 5; 325 MG/1; MG/1
2 TABLET ORAL ONCE
Status: COMPLETED | OUTPATIENT
Start: 2023-01-01 | End: 2023-01-01

## 2023-01-01 RX ORDER — ONDANSETRON 2 MG/ML
4 INJECTION INTRAMUSCULAR; INTRAVENOUS EVERY 6 HOURS PRN
Status: DISCONTINUED | OUTPATIENT
Start: 2023-01-01 | End: 2023-10-24

## 2023-01-01 RX ORDER — HYDROCODONE BITARTRATE AND ACETAMINOPHEN 5; 325 MG/1; MG/1
2 TABLET ORAL EVERY 4 HOURS PRN
Status: DISCONTINUED | OUTPATIENT
Start: 2023-01-01 | End: 2023-01-01

## 2023-01-01 RX ORDER — POLYETHYLENE GLYCOL 3350 17 G/17G
17 POWDER, FOR SOLUTION ORAL DAILY PRN
Status: DISCONTINUED | OUTPATIENT
Start: 2023-01-01 | End: 2023-01-01

## 2023-01-01 RX ORDER — NICOTINE POLACRILEX 4 MG
15 LOZENGE BUCCAL
Status: DISCONTINUED | OUTPATIENT
Start: 2023-01-01 | End: 2023-01-01

## 2023-01-01 RX ORDER — LORAZEPAM 2 MG/ML
2 INJECTION INTRAMUSCULAR EVERY 4 HOURS PRN
Status: DISCONTINUED | OUTPATIENT
Start: 2023-01-01 | End: 2023-10-24

## 2023-01-01 RX ORDER — ATORVASTATIN CALCIUM 20 MG/1
20 TABLET, FILM COATED ORAL NIGHTLY
Status: DISCONTINUED | OUTPATIENT
Start: 2023-01-01 | End: 2023-01-01

## 2023-01-01 RX ORDER — CALCIUM GLUCONATE 10 MG/ML
1 INJECTION, SOLUTION INTRAVENOUS ONCE
Status: COMPLETED | OUTPATIENT
Start: 2023-01-01 | End: 2023-01-01

## 2023-01-01 RX ORDER — ENEMA 19; 7 G/133ML; G/133ML
1 ENEMA RECTAL ONCE AS NEEDED
Status: DISCONTINUED | OUTPATIENT
Start: 2023-01-01 | End: 2023-01-01

## 2023-01-01 RX ORDER — HYDROMORPHONE HYDROCHLORIDE 1 MG/ML
0.5 INJECTION, SOLUTION INTRAMUSCULAR; INTRAVENOUS; SUBCUTANEOUS EVERY 30 MIN PRN
Status: DISCONTINUED | OUTPATIENT
Start: 2023-01-01 | End: 2023-10-24

## 2023-01-01 RX ORDER — POTASSIUM CHLORIDE 20 MEQ/1
40 TABLET, EXTENDED RELEASE ORAL EVERY 4 HOURS
Status: COMPLETED | OUTPATIENT
Start: 2023-01-01 | End: 2023-01-01

## 2023-01-01 RX ORDER — ONDANSETRON 2 MG/ML
4 INJECTION INTRAMUSCULAR; INTRAVENOUS EVERY 4 HOURS PRN
Status: ACTIVE | OUTPATIENT
Start: 2023-01-01 | End: 2023-01-01

## 2023-01-01 RX ORDER — VANCOMYCIN HYDROCHLORIDE 125 MG/1
125 CAPSULE ORAL 4 TIMES DAILY
Status: DISCONTINUED | OUTPATIENT
Start: 2023-01-01 | End: 2023-01-01

## 2023-01-01 RX ORDER — FLUCONAZOLE 100 MG/1
200 TABLET ORAL EVERY 24 HOURS
Status: DISCONTINUED | OUTPATIENT
Start: 2023-01-01 | End: 2023-01-01

## 2023-01-01 RX ORDER — HYDROMORPHONE HYDROCHLORIDE 1 MG/ML
0.5 INJECTION, SOLUTION INTRAMUSCULAR; INTRAVENOUS; SUBCUTANEOUS EVERY 30 MIN PRN
Status: ACTIVE | OUTPATIENT
Start: 2023-01-01 | End: 2023-01-01

## 2023-01-01 RX ORDER — SCOLOPAMINE TRANSDERMAL SYSTEM 1 MG/1
1 PATCH, EXTENDED RELEASE TRANSDERMAL
Status: DISCONTINUED | OUTPATIENT
Start: 2023-01-01 | End: 2023-10-24

## 2023-01-01 RX ORDER — RUFINAMIDE 40 MG/ML
1 SUSPENSION ORAL DAILY
Status: DISCONTINUED | OUTPATIENT
Start: 2023-01-01 | End: 2023-01-01

## 2023-01-01 RX ORDER — SENNOSIDES 8.6 MG
17.2 TABLET ORAL NIGHTLY PRN
Status: DISCONTINUED | OUTPATIENT
Start: 2023-01-01 | End: 2023-01-01

## 2023-01-01 RX ORDER — MORPHINE SULFATE 2 MG/ML
2 INJECTION, SOLUTION INTRAMUSCULAR; INTRAVENOUS
Status: DISCONTINUED | OUTPATIENT
Start: 2023-01-01 | End: 2023-10-24

## 2023-01-01 RX ORDER — LORAZEPAM 2 MG/ML
1 INJECTION INTRAMUSCULAR EVERY 4 HOURS PRN
Status: DISCONTINUED | OUTPATIENT
Start: 2023-01-01 | End: 2023-10-24

## 2023-01-01 RX ORDER — CHOLESTYRAMINE LIGHT 4 G/5.7G
4 POWDER, FOR SUSPENSION ORAL DAILY
Status: DISCONTINUED | OUTPATIENT
Start: 2023-01-01 | End: 2023-01-01

## 2023-01-01 RX ORDER — LORAZEPAM 2 MG/ML
0.5 INJECTION INTRAMUSCULAR EVERY 4 HOURS PRN
Status: DISCONTINUED | OUTPATIENT
Start: 2023-01-01 | End: 2023-10-24

## 2023-01-01 RX ORDER — ACETAMINOPHEN 325 MG/1
650 TABLET ORAL EVERY 4 HOURS PRN
Status: DISCONTINUED | OUTPATIENT
Start: 2023-01-01 | End: 2023-01-01

## 2023-01-01 RX ORDER — MAGNESIUM OXIDE 400 MG/1
400 TABLET ORAL ONCE
Status: COMPLETED | OUTPATIENT
Start: 2023-01-01 | End: 2023-01-01

## 2023-01-01 RX ORDER — DEXTROSE MONOHYDRATE 25 G/50ML
50 INJECTION, SOLUTION INTRAVENOUS
Status: DISCONTINUED | OUTPATIENT
Start: 2023-01-01 | End: 2023-01-01

## 2023-01-01 RX ORDER — VANCOMYCIN HYDROCHLORIDE 50 MG/ML
125 KIT ORAL 4 TIMES DAILY
Status: DISCONTINUED | OUTPATIENT
Start: 2023-01-01 | End: 2023-01-01

## 2023-08-03 RX ORDER — CEFADROXIL 500 MG/1
500 CAPSULE ORAL 2 TIMES DAILY
COMMUNITY
End: 2023-08-08

## 2023-08-03 RX ORDER — OMEPRAZOLE 20 MG/1
20 CAPSULE, DELAYED RELEASE ORAL
COMMUNITY

## 2023-08-08 ENCOUNTER — HOSPITAL ENCOUNTER (OUTPATIENT)
Facility: HOSPITAL | Age: 78
Setting detail: HOSPITAL OUTPATIENT SURGERY
Discharge: HOME | End: 2023-08-08
Attending: STUDENT IN AN ORGANIZED HEALTH CARE EDUCATION/TRAINING PROGRAM | Admitting: STUDENT IN AN ORGANIZED HEALTH CARE EDUCATION/TRAINING PROGRAM
Payer: MEDICARE

## 2023-08-08 VITALS
HEART RATE: 62 BPM | BODY MASS INDEX: 27.03 KG/M2 | WEIGHT: 203.94 LBS | HEIGHT: 73 IN | SYSTOLIC BLOOD PRESSURE: 166 MMHG | DIASTOLIC BLOOD PRESSURE: 68 MMHG | TEMPERATURE: 98 F | RESPIRATION RATE: 18 BRPM | OXYGEN SATURATION: 98 %

## 2023-08-08 PROCEDURE — 88312 SPECIAL STAINS GROUP 1: CPT | Performed by: STUDENT IN AN ORGANIZED HEALTH CARE EDUCATION/TRAINING PROGRAM

## 2023-08-08 PROCEDURE — 87102 FUNGUS ISOLATION CULTURE: CPT | Performed by: STUDENT IN AN ORGANIZED HEALTH CARE EDUCATION/TRAINING PROGRAM

## 2023-08-08 PROCEDURE — 87205 SMEAR GRAM STAIN: CPT | Performed by: STUDENT IN AN ORGANIZED HEALTH CARE EDUCATION/TRAINING PROGRAM

## 2023-08-08 PROCEDURE — 87206 SMEAR FLUORESCENT/ACID STAI: CPT | Performed by: STUDENT IN AN ORGANIZED HEALTH CARE EDUCATION/TRAINING PROGRAM

## 2023-08-08 PROCEDURE — 87075 CULTR BACTERIA EXCEPT BLOOD: CPT | Performed by: STUDENT IN AN ORGANIZED HEALTH CARE EDUCATION/TRAINING PROGRAM

## 2023-08-08 PROCEDURE — 87176 TISSUE HOMOGENIZATION CULTR: CPT | Performed by: STUDENT IN AN ORGANIZED HEALTH CARE EDUCATION/TRAINING PROGRAM

## 2023-08-08 PROCEDURE — 87070 CULTURE OTHR SPECIMN AEROBIC: CPT | Performed by: STUDENT IN AN ORGANIZED HEALTH CARE EDUCATION/TRAINING PROGRAM

## 2023-08-08 PROCEDURE — 87116 MYCOBACTERIA CULTURE: CPT | Performed by: STUDENT IN AN ORGANIZED HEALTH CARE EDUCATION/TRAINING PROGRAM

## 2023-08-08 PROCEDURE — 0JBK0ZZ EXCISION OF LEFT HAND SUBCUTANEOUS TISSUE AND FASCIA, OPEN APPROACH: ICD-10-PCS | Performed by: STUDENT IN AN ORGANIZED HEALTH CARE EDUCATION/TRAINING PROGRAM

## 2023-08-08 PROCEDURE — 88305 TISSUE EXAM BY PATHOLOGIST: CPT | Performed by: STUDENT IN AN ORGANIZED HEALTH CARE EDUCATION/TRAINING PROGRAM

## 2023-08-08 RX ORDER — LIDOCAINE HYDROCHLORIDE 10 MG/ML
INJECTION, SOLUTION INFILTRATION; PERINEURAL AS NEEDED
Status: DISCONTINUED | OUTPATIENT
Start: 2023-08-08 | End: 2023-08-08 | Stop reason: HOSPADM

## 2023-08-08 RX ORDER — SULFAMETHOXAZOLE AND TRIMETHOPRIM 800; 160 MG/1; MG/1
1 TABLET ORAL 2 TIMES DAILY
Qty: 14 TABLET | Refills: 0 | Status: SHIPPED | OUTPATIENT
Start: 2023-08-08 | End: 2023-08-15

## 2023-08-08 RX ORDER — BUPIVACAINE HYDROCHLORIDE 5 MG/ML
INJECTION, SOLUTION EPIDURAL; INTRACAUDAL AS NEEDED
Status: DISCONTINUED | OUTPATIENT
Start: 2023-08-08 | End: 2023-08-08 | Stop reason: HOSPADM

## 2023-08-08 NOTE — OPERATIVE REPORT
OPERATIVE REPORT    Patient Name: Melissa Rondon  Age:  [de-identified]year old  Sex: Male  MRN: [id number]  : 1945  Date of Surgery: 23  Primary Surgeon: Angelika Miramontes MD  Assistant(s): None      Preoperative Diagnosis: Left dorsal hand mass with possible fluid collection     Postoperative Diagnosis: Left dorsal hand mass      Operation Performed: Left dorsal hand mass excision, CPT code 77784     Implants: None     Anesthesia: Local    Complications: None    Estimated Blood Loss: Minimal    Tourniquet Time: 4 minutes    Specimens: Culture swab and tissue specimen    Antibiotics: None    INDICATIONS FOR SURGERY:   The patient is a [de-identified]year old year-old male who presented to my office with a benign, non-infected appearing mass on the dorsal aspect of his left hand that appeared after gardening. US imaging demonstrated a possible fluid collection without an obvious foreign body. The patient desired excision and drainage. The risks of surgery include, but are not limited to, bleeding, infection, injury to neurovascular structures, DVT, PE, other medical complications, recurrence of the mass, possible malignancy, wound complications, pain, and stiffness. The patient understood all of these and gave consent to proceed freely. OPERATIVE PROCEDURE:  The patient was seen in the preoperative holding area, where the correct site was marked. All questions regarding the procedure and postoperative rehabilitation were discussed. The consent was signed. H&P and allergies were reviewed. The patient was then brought into the operating room and placed supine on a regular table with a hand table. A nonsterile tourniquet was placed on the upper brachium. A time-out was performed by all members of the staff, which confirmed the left side was the correct operative side. I administered a local block under sterile conditions using a combination of both Marcaine and Lidocaine plain.  The left upper extremity was then prepped and draped in standard sterile fashion. The limb was exsanguinated by elevation only and the tourniquet was then inflated to 250 mmHg. I made a longitudinal incision overlying the mass, approximately 1.5 cm in length. Incision was made through skin with a 15 blade; the epidermis was noted to be paper thin. There was a healed eschar within the center potion of the mass and was adherent to an underlying small, hard soft tissue mass that seemed to be consistent with an inflammatory granuloma, that were sharply excised and sent to Pathology. I also sent a tissue culture swab. There was no fluid collection or purulence noted. I debrided any necrotic or inflamed appearing subcutaneous tissue. The underlying wound bed was healthy with granulation and adipose tissue. The mass did not probe deeply to the level of the extensor tendons. Satisfied, the wound was copiously irrigated. The tourniquet was let down for the time noted above. Hemostasis was achieved with bipolar cautery. Wound closure was achieved with 4-0 Nylon. All the fingers were warm with good capillary refill. All sponge, sharp, and instrument counts were correct. A bulky sterile dressing  was applied. The patient was then transferred to the recovery room in stable condition.     Post-Operative Plan:  PWB soft dressing   OTC medication  Follow up cultures and Pathology, patient to be sent home on oral abx  Follow up in office in 2 weeks     Shabnam Sales MD  Orthopedic Surgery

## 2023-08-08 NOTE — DISCHARGE INSTRUCTIONS
Asael Tierney MD  Hand and Upper Extremity Surgeon  [facility]       Date of Surgery: 8/8/2023  MD Follow up: Two weeks  Pain Rx: None (Please take OTC Tylenol/Motrin)  Dressings: Soft    POST-OPERATIVE INSTRUCTIONS    Follow Up: Your follow up should have been made for you before the surgery  If you do not have a follow up day or have questions regarding it, call [phone] to schedule an appointment two weeks after surgery    You may be placed in one of the following dressings after surgery:  (x) Soft dressing: if placed in a soft dressing with no hard plaster underneath, you can generally remove this dressing in 10 days after surgery, apply a band-aid, and keep your incisions clean and dry. If you are not sure, do not hesitate to call our office regarding directions    Showers:  Keep dressing and/or wound dry until you see me in the office    Activity:  You can be partial weight bearing to the operative extremity, lifting no heavier than 5 lbs, make a fist often and move fingers   It is important to elevate your operative extremity above the level of your heart to help with swelling, which can contribute to pain. Swelling, warmth, bruising, and redness can be normal part of the initial recovery process      Pain Medication Plan:  The goal of pain medication after surgery is to keep your pain at a controlled and tolerable level.  It is important to understand that pain medication may not take ALL of the pain away  The pain medications I have recommended for your procedure are over the counter medications (see below)  Please call [phone] if you have issues with these medications    Over the counter pain medications  Start with over the counter pain medications first  Naproxen (Brand name Aleve)   Comes in 220 mg tablets  Take this once in the morning and once at night initially, with food  If this does not control your pain, you can take 2 pills in the morning and 2 at night  Do not take [Dear  ___] : Dear  [unfilled], [Consult Letter:] : I had the pleasure of evaluating your patient, [unfilled]. this if you have medical conditions affecting your heart/kidneys  Acetaminophen (Brand name Tylenol) Extra Strength  Comes in 500 mg tablets  You can take Tylenol 3 times a day for a maximum of 3000 mg a day  Do not take this if you have medical conditions affecting your liver    Other Medications: Please take your antibiotic as prescribed (Bactrim)    If you have any of the following signs or symptoms please proceed to your nearest Emergency Room:   Chest Pain  Shortness of Breath/ Difficulty Breathing  Excessive Bleeding  Anything else you deem an emergency    Please call the surgical team if you have the following:   Excessive swelling   Foul smelling odor from your wounds or dressings  Discharge from your surgical wounds  Persistent pain that does not get better with the prescription pain medication  Persistent nausea and/or vomiting  Fevers greater than 101.1 after the first 48 hrs post op  Dramatic changes to you post operative pain    You can resume all of your home medications after surgery with the following exceptions:   Immune Modulating Drugs: These are drugs for rheumatoid arthritis, psoriasis and chemotherapy. If you are taking these drugs please check with the surgical team  Steroids: please ask the surgical team when to restart your normal steroid doses  Blood thinners: these are medications like Coumadin may be held post operatively in some cases, please check with your surgical team or your surgeon's nurse     Please do not hesitate to contact us with questions or concerns:    If you have issues during the day, please call the orthopedic clinic at [phone]  If you have urgent questions after hours please call [phone] and ask to page the PA on call [Please see my note below.] : Please see my note below. [Sincerely,] : Sincerely, [Consult Closing:] : Thank you very much for allowing me to participate in the care of this patient.  If you have any questions, please do not hesitate to contact me. [FreeTextEntry2] : RITESH HALL  [FreeTextEntry3] : Mahesh Irene MD\par \par ______________________________________________\par [facility]: Hip/Knee Arthroplasty\par [address], [facility] [address], [place] [address]\par (t) [phone]\par (f) [phone]

## 2023-08-17 ENCOUNTER — HOSPITAL ENCOUNTER (OUTPATIENT)
Facility: HOSPITAL | Age: 78
Setting detail: HOSPITAL OUTPATIENT SURGERY
Discharge: HOME | End: 2023-08-17
Attending: STUDENT IN AN ORGANIZED HEALTH CARE EDUCATION/TRAINING PROGRAM | Admitting: STUDENT IN AN ORGANIZED HEALTH CARE EDUCATION/TRAINING PROGRAM
Payer: MEDICARE

## 2023-08-17 VITALS
TEMPERATURE: 98 F | WEIGHT: 203.06 LBS | DIASTOLIC BLOOD PRESSURE: 59 MMHG | HEART RATE: 66 BPM | OXYGEN SATURATION: 95 % | SYSTOLIC BLOOD PRESSURE: 132 MMHG | BODY MASS INDEX: 26.91 KG/M2 | RESPIRATION RATE: 16 BRPM | HEIGHT: 73 IN

## 2023-08-17 PROCEDURE — 88305 TISSUE EXAM BY PATHOLOGIST: CPT | Performed by: STUDENT IN AN ORGANIZED HEALTH CARE EDUCATION/TRAINING PROGRAM

## 2023-08-17 PROCEDURE — 0JBK0ZZ EXCISION OF LEFT HAND SUBCUTANEOUS TISSUE AND FASCIA, OPEN APPROACH: ICD-10-PCS | Performed by: STUDENT IN AN ORGANIZED HEALTH CARE EDUCATION/TRAINING PROGRAM

## 2023-08-17 PROCEDURE — 88331 PATH CONSLTJ SURG 1 BLK 1SPC: CPT | Performed by: STUDENT IN AN ORGANIZED HEALTH CARE EDUCATION/TRAINING PROGRAM

## 2023-08-17 RX ORDER — BUPIVACAINE HYDROCHLORIDE AND EPINEPHRINE 2.5; 5 MG/ML; UG/ML
INJECTION, SOLUTION EPIDURAL; INFILTRATION; INTRACAUDAL; PERINEURAL AS NEEDED
Status: DISCONTINUED | OUTPATIENT
Start: 2023-08-17 | End: 2023-08-17 | Stop reason: HOSPADM

## 2023-08-17 RX ORDER — LIDOCAINE HYDROCHLORIDE 10 MG/ML
INJECTION, SOLUTION INFILTRATION; PERINEURAL AS NEEDED
Status: DISCONTINUED | OUTPATIENT
Start: 2023-08-17 | End: 2023-08-17 | Stop reason: HOSPADM

## 2023-08-17 NOTE — DISCHARGE INSTRUCTIONS
Princess Aguilera MD  Hand and Upper Extremity Surgeon  [facility]       Date of Surgery: 8/17/2023  MD Follow up: Two weeks  Pain Rx: None (OTC medication)  Dressings: Soft    POST-OPERATIVE INSTRUCTIONS    Follow Up: Your follow up should have been made for you before the surgery  If you do not have a follow up day or have questions regarding it, call [phone] to schedule an appointment two weeks after surgery      You may be placed in one of the following dressings after surgery:  (x) Soft dressing: if placed in a soft dressing with no hard plaster underneath, you can generally remove this dressing in 10 days after surgery and keep your incisions clean and dry. If you are not sure, do not hesitate to call our office regarding directions    Showers:  Keep dressing and/or wound dry until you see me in the office    Activity:  You can be partial weight bearing to the operative extremity, lifting no heavier than 5 lbs, make a fist often and move fingers   It is important to elevate your operative extremity above the level of your heart to help with swelling, which can contribute to pain. Swelling, warmth, bruising, and redness can be normal part of the initial recovery process      Pain Medication Plan:  The goal of pain medication after surgery is to keep your pain at a controlled and tolerable level.  It is important to understand that pain medication may not take ALL of the pain away  Your pain medications should have been prescribed electronically to your pharmacy  The pain medications I have recommended for your procedure are over the counter medication  Please call [phone] if you have issues with these medications    Over the counter pain medications  Start with over the counter pain medications first  Naproxen (Brand name Aleve)   Comes in 220 mg tablets  Take this once in the morning and once at night initially, with food  If this does not control your pain, you can take 2 pills in the morning and 2 at night  Do not take this if you have medical conditions affecting your heart/kidneys  Acetaminophen (Brand name Tylenol) Extra Strength  Comes in 650 mg tablets  You can take Tylenol 3 times a day for a maximum of 3000 mg a day  Do not take this if you have medical conditions affecting your liver    If you have any of the following signs or symptoms please proceed to your nearest Emergency Room:   Chest Pain  Shortness of Breath/ Difficulty Breathing  Excessive Bleeding  Anything else you deem an emergency    Please call the surgical team if you have the following:   Excessive swelling   Foul smelling odor from your wounds or dressings  Discharge from your surgical wounds  Persistent pain that does not get better with the prescription pain medication  Persistent nausea and/or vomiting  Fevers greater than 101.1 after the first 48 hrs post op  Dramatic changes to you post operative pain    You can resume all of your home medications after surgery with the following exceptions:   Immune Modulating Drugs: These are drugs for rheumatoid arthritis, psoriasis and chemotherapy. If you are taking these drugs please check with the surgical team  Steroids: please ask the surgical team when to restart your normal steroid doses  Blood thinners: these are medications like Coumadin may be held post operatively in some cases, please check with your surgical team or your surgeon's nurse     Please do not hesitate to contact us with questions or concerns:    If you have issues during the day, please call the orthopedic clinic at [phone]  If you have urgent questions after hours please call [phone] and ask to page the PA on call

## 2023-08-17 NOTE — H&P
History & Physical Examination    Name: Samson Harris  Patient ID:  [id number]  Date:  8/17/2023  YOB: 1945 AGE:  [de-identified]year old SEX:  male      M.D./D.O./D.P.M PERFORMING SURGERY/PROCEDURE:    Lennox Castro MD    Diagnosis:  SQUAMOUS CELL CARCINOMA LEFT HAND    Procedure: LEFT HAND MASS WIDE EXCISION, POSSIBLE ALLOGRAFT    Anesthesia Type: Local    Allergies: No Known Allergies    Medications:  No current outpatient medications on file. Medical  History:  has a past medical history of Cancer ([address] (2023), Disseminated zoster (11/25/2013), Diverticulosis of large intestine, Esophageal reflux, Hearing impairment, High cholesterol, HYPERLIPIDEMIA, Hyperlipidemia Rx: simvastatin, calcium score 0 (8/2018) (04/14/2015), OTHER DISEASES, and Renal disorder. He has no past medical history of Anesthesia complication, Difficult intubation, Family history of malignant hyperthermia, Family history of pseudocholinesterase deficiency, History of adverse reaction to anesthesia, motion sickness, Malignant hyperthermia, PONV (postoperative nausea and vomiting), or Pseudocholinesterase deficiency. Surgical History:  has a past surgical history that includes skin surgery; colonoscopy; and colonoscopy (N/A, 5/3/2017). Family History: family history includes Heart Disorder in his father; Hypertension in his son; Other in his brother and mother. Physical Exam:     System Review is Normal Exam is Normal If not normal, explain   HEENT n/a n/a    NECK & BACK n/a n/a    HEART yes yes    LUNGS yes yes    ABDOMEN n/a n/a    UROGENITAL n/a n/a    EXTREMITIES yes yes    MENTAL STATUS yes yes        Impression: Patient with SCC of the left hand after removal    Plan: Wide re-excision of SCC with possible allograft    I have discussed the risks and benefits and alternatives with the patient/family. They understand and agree to proceed with the plan of care.   Yes      Asael Tierney MD 08/17/23

## 2023-08-17 NOTE — OPERATIVE REPORT
OPERATIVE REPORT    Patient Name: Ameya Malik  Age:  [de-identified]year old  Sex: Male  MRN: [id number]  : 1945  Date of Surgery: 23  Primary Surgeon: Aron Mandujano MD  Assistant(s): None     Preoperative Diagnosis: Left dorsal hand squamous cell carcinoma     Postoperative Diagnosis: Same      Operation Performed: Left dorsal hand squamous cell carcinoma wide re-excision with primary closure, CPT code 46861     Implants: None     Anesthesia: Local    Complications: None    Estimated Blood Loss: Minimal    Tourniquet Time: 5 minutes    Specimens: Deep, proximal, distal, radial, and ulnar margins    Antibiotics: None    INDICATIONS FOR SURGERY:   The patient is a [de-identified]year old year-old male who presented to my office with a left dorsal hand mass after gardening and US imaging concerning for a fluid collection. The mass was excised. Final pathology was consistent with a squamous cell carcinoma. After a discussion of the non-operative and operative options going forward, I recommended and the patient elected to proceed with re-excision to guarantee negative margins with primary closure versus use of allograft. The risks of surgery include, but are not limited to, bleeding, infection, injury to neurovascular structures, DVT, PE, other medical complications, pain, stiffness, wound complications, need for more surgery include repeat wide excision, and persistence or recurrence of malignancy. The patient understood all of these and gave consent to proceed freely. OPERATIVE PROCEDURE:  The patient was seen in the preoperative holding area, where the correct site was marked. All questions regarding the procedure and postoperative rehabilitation were discussed. The consent was signed. H&P and allergies were reviewed. The patient was then brought into the operating room and placed supine on a regular table with a hand table. All bony prominences were padded.  . A nonsterile tourniquet was placed on the upper brachium. The left upper extremity was then prepped and draped in standard sterile fashion. A time-out was performed by all members of the staff, which confirmed the left side was the correct operative side. I then administered a combination of Lidocaine plain and Marcaine with epinephrine to the field for analgesia. The limb was exsanguinated by elevation only and the tourniquet was then inflated to 250 mmHg. I removed the patient's existing sutures from his longitudinal incision. I then sharply excised the margins of the wound to include dermis and epidermis as well as underlying subcutaneous fat as well as the previous suture margins to ensure a minimum of a 4 mm margin on all sides and deeply from the previous excision/resection. Meticulous hemostasis was achieved with bipolar cautery. The deep margin was the only margin difficult to appreciate in term of complete excision, therefore a deep margin specimen was sent for frozen analysis to Pathology which was negative for tumor cells. The tourniquet was let down prior to sending the frozen specimen for the time noted above and the wound was copiously irrigated followed by again, meticulous hemostasis was achieved with a bipolar cautery device. Wound closure was achieved with 4-0 Nylon, the epidermis was mobile enough to allow a primary tensionless closure of the defect. All the fingers were warm with good capillary refill. All sponge, sharp, and instrument counts were correct. A bulky sterile dressing was applied. The patient was then transferred to the recovery room in stable condition.     Post-Operative Plan:  PWB soft dressing  Follow up Pathology margin specimens  Follow up in office in two weeks       Madan Titus MD  Orthopedic Surgery

## 2023-08-21 ENCOUNTER — TELEPHONE (OUTPATIENT)
Dept: HEMATOLOGY/ONCOLOGY | Facility: HOSPITAL | Age: 78
End: 2023-08-21

## 2023-08-21 NOTE — TELEPHONE ENCOUNTER
Patient calling to schedule Consult with Dr Sohail Solorzano referred  from a Friend  who is a Patient of Dr Jesus Armas to treat  at [address]  Was DX at [facility] in [place] [facility]  Biopsy of [place] last week  Diagnoses   Metastasis to liver [facility])   Mass of pancreas

## 2023-08-22 ENCOUNTER — TELEPHONE (OUTPATIENT)
Dept: HEMATOLOGY/ONCOLOGY | Facility: HOSPITAL | Age: 78
End: 2023-08-22

## 2023-08-29 ENCOUNTER — NURSE NAVIGATOR ENCOUNTER (OUTPATIENT)
Dept: HEMATOLOGY/ONCOLOGY | Facility: HOSPITAL | Age: 78
End: 2023-08-29

## 2023-08-29 ENCOUNTER — OFFICE VISIT (OUTPATIENT)
Dept: HEMATOLOGY/ONCOLOGY | Facility: HOSPITAL | Age: 78
End: 2023-08-29
Attending: INTERNAL MEDICINE
Payer: MEDICARE

## 2023-08-29 VITALS
OXYGEN SATURATION: 75 % | DIASTOLIC BLOOD PRESSURE: 74 MMHG | TEMPERATURE: 98 F | SYSTOLIC BLOOD PRESSURE: 122 MMHG | BODY MASS INDEX: 26 KG/M2 | WEIGHT: 197.81 LBS | HEART RATE: 75 BPM

## 2023-08-29 DIAGNOSIS — C25.9 MALIGNANT NEOPLASM OF PANCREAS, UNSPECIFIED LOCATION OF MALIGNANCY (HCC): Primary | ICD-10-CM

## 2023-08-29 LAB
ALBUMIN SERPL-MCNC: 3.4 G/DL (ref 3.4–5)
ALBUMIN/GLOB SERPL: 0.9 {RATIO} (ref 1–2)
ALP LIVER SERPL-CCNC: 99 U/L
ALT SERPL-CCNC: 46 U/L
ANION GAP SERPL CALC-SCNC: 4 MMOL/L (ref 0–18)
AST SERPL-CCNC: 19 U/L (ref 15–37)
BASOPHILS # BLD AUTO: 0.08 X10(3) UL (ref 0–0.2)
BASOPHILS NFR BLD AUTO: 1.6 %
BILIRUB SERPL-MCNC: 1 MG/DL (ref 0.1–2)
BUN BLD-MCNC: 20 MG/DL (ref 7–18)
CALCIUM BLD-MCNC: 9.1 MG/DL (ref 8.5–10.1)
CANCER AG19-9 SERPL-ACNC: 76.5 U/ML (ref ?–37)
CHLORIDE SERPL-SCNC: 107 MMOL/L (ref 98–112)
CO2 SERPL-SCNC: 28 MMOL/L (ref 21–32)
CREAT BLD-MCNC: 1.19 MG/DL
EGFRCR SERPLBLD CKD-EPI 2021: 62 ML/MIN/1.73M2 (ref 60–?)
EOSINOPHIL # BLD AUTO: 0.11 X10(3) UL (ref 0–0.7)
EOSINOPHIL NFR BLD AUTO: 2.2 %
ERYTHROCYTE [DISTWIDTH] IN BLOOD BY AUTOMATED COUNT: 13.2 %
FASTING STATUS PATIENT QL REPORTED: NO
GLOBULIN PLAS-MCNC: 4 G/DL (ref 2.8–4.4)
GLUCOSE BLD-MCNC: 95 MG/DL (ref 70–99)
HCT VFR BLD AUTO: 43.6 %
HGB BLD-MCNC: 14.6 G/DL
IMM GRANULOCYTES # BLD AUTO: 0.01 X10(3) UL (ref 0–1)
IMM GRANULOCYTES NFR BLD: 0.2 %
LYMPHOCYTES # BLD AUTO: 1.06 X10(3) UL (ref 1–4)
LYMPHOCYTES NFR BLD AUTO: 21.1 %
MCH RBC QN AUTO: 32.5 PG (ref 26–34)
MCHC RBC AUTO-ENTMCNC: 33.5 G/DL (ref 31–37)
MCV RBC AUTO: 97.1 FL
MONOCYTES # BLD AUTO: 0.72 X10(3) UL (ref 0.1–1)
MONOCYTES NFR BLD AUTO: 14.3 %
NEUTROPHILS # BLD AUTO: 3.05 X10 (3) UL (ref 1.5–7.7)
NEUTROPHILS # BLD AUTO: 3.05 X10(3) UL (ref 1.5–7.7)
NEUTROPHILS NFR BLD AUTO: 60.6 %
OSMOLALITY SERPL CALC.SUM OF ELEC: 290 MOSM/KG (ref 275–295)
PLATELET # BLD AUTO: 181 10(3)UL (ref 150–450)
POTASSIUM SERPL-SCNC: 4 MMOL/L (ref 3.5–5.1)
PROT SERPL-MCNC: 7.4 G/DL (ref 6.4–8.2)
RBC # BLD AUTO: 4.49 X10(6)UL
SODIUM SERPL-SCNC: 139 MMOL/L (ref 136–145)
WBC # BLD AUTO: 5 X10(3) UL (ref 4–11)

## 2023-08-29 PROCEDURE — 99205 OFFICE O/P NEW HI 60 MIN: CPT | Performed by: INTERNAL MEDICINE

## 2023-08-29 RX ORDER — METFORMIN HYDROCHLORIDE 500 MG/1
500 TABLET, EXTENDED RELEASE ORAL
COMMUNITY
Start: 2023-08-21

## 2023-08-30 NOTE — PAT NURSING NOTE
PreOp Instructions for port implant     You are scheduled for: an Interventional Radiology Procedure     Date of Procedure: 09/05/23. Check in at 10:30 am     Diet Instructions: Do not eat or drink anything after midnight                 Diabetic Instructions: Do not take morning dose of your diabetic medications; If you wear a CGM (continuous glucose monitor), you will need to remove the entire device (sensor/transmitter) and leave at home prior to your procedure    Skin Prep: Shower with antibacterial soap using a clean washcloth, prior to procedure          Driving After Procedure: If sedation is given, you WILL NOT be able to drive home. You will need a responsible adult  to drive you home. Discharge Teaching: Your nurse will give you specific instructions before discharge; Most people can resume normal activities in 2-3 days; Any questions, please call the physician's office        Park in the [place] parking lot. Check in at the [place] reception desk. Our  will be there to check you in for your procedure. Please bring your insurance cards and ID with you.  [employer] parking is available starting at 5 am.

## 2023-09-01 ENCOUNTER — OFFICE VISIT (OUTPATIENT)
Dept: HEMATOLOGY/ONCOLOGY | Facility: HOSPITAL | Age: 78
End: 2023-09-01
Attending: INTERNAL MEDICINE
Payer: MEDICARE

## 2023-09-01 DIAGNOSIS — C25.9 MALIGNANT NEOPLASM OF PANCREAS, UNSPECIFIED LOCATION OF MALIGNANCY (HCC): Primary | ICD-10-CM

## 2023-09-01 DIAGNOSIS — Z71.89 OTHER SPECIFIED COUNSELING: ICD-10-CM

## 2023-09-01 PROCEDURE — 99215 OFFICE O/P EST HI 40 MIN: CPT | Performed by: CLINICAL NURSE SPECIALIST

## 2023-09-01 RX ORDER — PROCHLORPERAZINE MALEATE 10 MG
10 TABLET ORAL EVERY 6 HOURS PRN
Qty: 30 TABLET | Refills: 3 | Status: SHIPPED | OUTPATIENT
Start: 2023-09-01

## 2023-09-01 RX ORDER — ONDANSETRON HYDROCHLORIDE 8 MG/1
8 TABLET, FILM COATED ORAL EVERY 8 HOURS PRN
Qty: 30 TABLET | Refills: 3 | Status: SHIPPED | OUTPATIENT
Start: 2023-09-01

## 2023-09-01 NOTE — H&P (VIEW-ONLY)
IV Chemotherapy Education    Patient:Jude Peres     Date: 23   Diagnosis:  Pancreatic cancer   Caregivers present: Wife, son    Drug names:  Gemcitabine, nab-paclitaxel    Myelosuppression  Decrease in wbc  Decrease in hgb  Decrease in platelets  Risk of infection  Fatigue  Risk of bleeding  Notify MD/RN of any chills or fever 100.5and above:     Gastrointestinal Toxicities:    Stomatitis, sensitivity or oropharyngeal membranes, dry mouth, thrush  Appropriate oral hygiene  Changes in taste perception   Loss of appetite, possible weightloss  Nausea and vomiting   Use of anti-nausea medications  Diarrhea   Use of Imodium  Constipation  Use of various laxatives      Treatment Effects on Hair:  Alopecia or thinning      Neurotoxicity:  Peripheral neuropathy      Hepatotoxicity  Rise in LFTs    Nephrotoxicity  Dehydration  Electrolyte abnormalities    Dermatologic toxicity:  Rash        Teaching Materials Provided:      [facility] Chemotherapy information sheets  ACS Understanding Chemotherapy Booklet  ACS Nutrition for the Person with Cancer during Treatment / Regan Marquez information sheet  When to contact the Treatment Team Information Sheet  Side Effect Management [facility] Information sheet    Patient and caregiver were given ample opportunity to ask questions. All questions and concerns addressed. We discussed self care techniques, symptom management, fluid, diet, and activities. Chemotherapy Consent Form signed by the patient. I spent  50 minutes counseling patient regarding the above documented side effects and management, when to call provider and contact information.    Encounter Times  PreChartin minutes    Reviewing/Obtaining:   minutes      Medical Exam:   minutes    Plan:   minutes      Notes: 10 minutes    Counseling/Education: 50 minutes      Referring/Communicating:   minutes    Ind Interpretation:   minutes      Care Coordination:   minutes       My total time spent caring for the patient on the day of the encounter: 65 minutes.          Marc Rosales APN  Nurse Practitioner  [facility] [address] [place] [address]

## 2023-09-05 ENCOUNTER — HOSPITAL ENCOUNTER (OUTPATIENT)
Dept: INTERVENTIONAL RADIOLOGY/VASCULAR | Facility: HOSPITAL | Age: 78
Discharge: HOME | End: 2023-09-05
Attending: INTERNAL MEDICINE | Admitting: INTERNAL MEDICINE
Payer: MEDICARE

## 2023-09-05 VITALS
SYSTOLIC BLOOD PRESSURE: 129 MMHG | TEMPERATURE: 97 F | OXYGEN SATURATION: 97 % | DIASTOLIC BLOOD PRESSURE: 69 MMHG | RESPIRATION RATE: 17 BRPM | HEART RATE: 61 BPM

## 2023-09-05 DIAGNOSIS — C25.9 MALIGNANT NEOPLASM OF PANCREAS, UNSPECIFIED LOCATION OF MALIGNANCY (HCC): ICD-10-CM

## 2023-09-05 LAB
GLUCOSE BLD-MCNC: 99 MG/DL (ref 70–99)
INR: 1.1 (ref 0.8–1.3)

## 2023-09-05 PROCEDURE — 0JH63WZ INSERTION OF TOTALLY IMPLANTABLE VASCULAR ACCESS DEVICE INTO CHEST SUBCUTANEOUS TISSUE AND FASCIA, PERCUTANEOUS APPROACH: ICD-10-PCS | Performed by: STUDENT IN AN ORGANIZED HEALTH CARE EDUCATION/TRAINING PROGRAM

## 2023-09-05 PROCEDURE — 76937 US GUIDE VASCULAR ACCESS: CPT | Performed by: STUDENT IN AN ORGANIZED HEALTH CARE EDUCATION/TRAINING PROGRAM

## 2023-09-05 PROCEDURE — B548ZZA ULTRASONOGRAPHY OF SUPERIOR VENA CAVA, GUIDANCE: ICD-10-PCS | Performed by: STUDENT IN AN ORGANIZED HEALTH CARE EDUCATION/TRAINING PROGRAM

## 2023-09-05 PROCEDURE — 85610 PROTHROMBIN TIME: CPT | Performed by: STUDENT IN AN ORGANIZED HEALTH CARE EDUCATION/TRAINING PROGRAM

## 2023-09-05 PROCEDURE — B5181ZA FLUOROSCOPY OF SUPERIOR VENA CAVA USING LOW OSMOLAR CONTRAST, GUIDANCE: ICD-10-PCS | Performed by: STUDENT IN AN ORGANIZED HEALTH CARE EDUCATION/TRAINING PROGRAM

## 2023-09-05 PROCEDURE — 02HV33Z INSERTION OF INFUSION DEVICE INTO SUPERIOR VENA CAVA, PERCUTANEOUS APPROACH: ICD-10-PCS | Performed by: STUDENT IN AN ORGANIZED HEALTH CARE EDUCATION/TRAINING PROGRAM

## 2023-09-05 PROCEDURE — 77001 FLUOROGUIDE FOR VEIN DEVICE: CPT | Performed by: STUDENT IN AN ORGANIZED HEALTH CARE EDUCATION/TRAINING PROGRAM

## 2023-09-05 PROCEDURE — 36561 INSERT TUNNELED CV CATH: CPT | Performed by: STUDENT IN AN ORGANIZED HEALTH CARE EDUCATION/TRAINING PROGRAM

## 2023-09-05 PROCEDURE — 82962 GLUCOSE BLOOD TEST: CPT

## 2023-09-05 PROCEDURE — 36589 REMOVAL TUNNELED CV CATH: CPT | Performed by: STUDENT IN AN ORGANIZED HEALTH CARE EDUCATION/TRAINING PROGRAM

## 2023-09-05 RX ORDER — LIDOCAINE HYDROCHLORIDE AND EPINEPHRINE BITARTRATE 20; .01 MG/ML; MG/ML
INJECTION, SOLUTION SUBCUTANEOUS
Status: COMPLETED
Start: 2023-09-05 | End: 2023-09-05

## 2023-09-05 RX ORDER — CEFAZOLIN SODIUM/WATER 2 G/20 ML
SYRINGE (ML) INTRAVENOUS
Status: COMPLETED
Start: 2023-09-05 | End: 2023-09-05

## 2023-09-05 RX ORDER — SODIUM CHLORIDE 9 MG/ML
INJECTION, SOLUTION INTRAVENOUS CONTINUOUS
Status: DISCONTINUED | OUTPATIENT
Start: 2023-09-05 | End: 2023-09-05

## 2023-09-05 RX ORDER — MIDAZOLAM HYDROCHLORIDE 1 MG/ML
INJECTION INTRAMUSCULAR; INTRAVENOUS
Status: COMPLETED
Start: 2023-09-05 | End: 2023-09-05

## 2023-09-05 RX ORDER — HEPARIN SODIUM 5000 [USP'U]/ML
INJECTION, SOLUTION INTRAVENOUS; SUBCUTANEOUS
Status: COMPLETED
Start: 2023-09-05 | End: 2023-09-05

## 2023-09-05 RX ORDER — LIDOCAINE HYDROCHLORIDE 10 MG/ML
INJECTION, SOLUTION INFILTRATION; PERINEURAL
Status: COMPLETED
Start: 2023-09-05 | End: 2023-09-05

## 2023-09-05 NOTE — PROCEDURES
[facility]  Procedure Note    Myrna Blake Patient Status:  Outpatient    1945 MRN [id number]   Location [address] Attending Snehal Oakes MD   [facility] Day # 0 PCP Balta Valverde MD     Procedure: [facility] placement completed 2023    Pre-Procedure Diagnosis:  Pancreatic ca    Post-Procedure Diagnosis: Same    Anesthesia:  Local and Sedation    Findings:  Patent right internal jugular vein. Successful placement of right chest port catheter via right internal jugular vein. Port aspirates and flushes freely. Locked with Heparin. Port is ready for use.       Specimens: None    Blood Loss:  <5cc    Tourniquet Time: 0  Complications:  None  Drains:  None    Secondary Diagnosis:  None    Adonay Rai MD  2023

## 2023-09-05 NOTE — INTERVAL H&P NOTE
The above referenced H&P was reviewed by General Lori MD on 9/5/2023, the patient was examined and no significant changes have occurred in the patient's condition since the H&P was performed. Risks, benefits, alternative treatments and consequences of no treatment were discussed. We will proceed with procedure as planned.       General Lori MD  9/5/2023  11:49 AM

## 2023-09-05 NOTE — INTERVAL H&P NOTE
The above referenced H&P was reviewed by Yesenia Leonardo MD on 9/5/2023, the patient was examined and no significant changes have occurred in the patient's condition since the H&P was performed. Risks, benefits, alternative treatments and consequences of no treatment were discussed. We will proceed with procedure as planned.       Yesenia Leonardo MD  9/5/2023  12:42 PM

## 2023-09-05 NOTE — PROGRESS NOTES
Rc'd pt from IR s/p port insertion in stable condition. VSS. Aquacel and white 2x2 to right chest/neck are soft, clean and dry. No bleeding or hematoma. Pt denies c/o pain or discomfort. Pt tolerating po intake well. Family at bedside. 13:45: Bedrest completed. Pt ambulated and tolerated well. Voided. IV removed with catheter intact. Reviewed discharge instructions with pt and wife. Verbalizes understanding. Home via w/c in stable condition without c/o. Pts son is the .       ~ Follow up with MDs as scheduled. ~ Rest today and resume regular activity in the am as tolerated.   ~ No driving or drinking alcohol for 24hrs.   ~ Resume diet and medications.   ~ No lifting more than 10 pounds for 48hrs. Avoid lifting heavy objects such as a purse or a backpack from the shoulder of which the port was placed for 48hrs.   ~ Avoid strenuous activity with the arm of which th port was placed for 48hrs.   ~ Keep bandage completely dry and intact. Small white bandage can be removed tomorrow. Leave large brown bandage on and cancer nurses will remove that tomorrow and give further bathing instructions.   ~ Notify MD if any signs of infection. .. Fever, chills, redness, swelling or drainage.   ~ It is normal to have some discomfort at the incisions ite for the first 24-48hrs. You make take over the counter Tylenol if needed.   ~ Keep port card in your wallet.

## 2023-09-06 ENCOUNTER — OFFICE VISIT (OUTPATIENT)
Dept: HEMATOLOGY/ONCOLOGY | Facility: HOSPITAL | Age: 78
End: 2023-09-06
Attending: INTERNAL MEDICINE
Payer: MEDICARE

## 2023-09-06 VITALS
HEIGHT: 71.69 IN | RESPIRATION RATE: 18 BRPM | TEMPERATURE: 98 F | WEIGHT: 199.38 LBS | DIASTOLIC BLOOD PRESSURE: 76 MMHG | SYSTOLIC BLOOD PRESSURE: 122 MMHG | HEART RATE: 76 BPM | OXYGEN SATURATION: 95 % | BODY MASS INDEX: 27.3 KG/M2

## 2023-09-06 DIAGNOSIS — C25.9 MALIGNANT NEOPLASM OF PANCREAS, UNSPECIFIED LOCATION OF MALIGNANCY (HCC): ICD-10-CM

## 2023-09-06 DIAGNOSIS — C25.9 MALIGNANT NEOPLASM OF PANCREAS, UNSPECIFIED LOCATION OF MALIGNANCY (HCC): Primary | ICD-10-CM

## 2023-09-06 LAB
ALBUMIN SERPL-MCNC: 3 G/DL (ref 3.4–5)
ALBUMIN/GLOB SERPL: 0.8 {RATIO} (ref 1–2)
ALP LIVER SERPL-CCNC: 99 U/L
ALT SERPL-CCNC: 34 U/L
ANION GAP SERPL CALC-SCNC: 5 MMOL/L (ref 0–18)
AST SERPL-CCNC: 21 U/L (ref 15–37)
BASOPHILS # BLD AUTO: 0.04 X10(3) UL (ref 0–0.2)
BASOPHILS NFR BLD AUTO: 0.8 %
BILIRUB SERPL-MCNC: 1 MG/DL (ref 0.1–2)
BUN BLD-MCNC: 20 MG/DL (ref 7–18)
CALCIUM BLD-MCNC: 8.4 MG/DL (ref 8.5–10.1)
CANCER AG19-9 SERPL-ACNC: 76.7 U/ML (ref ?–37)
CHLORIDE SERPL-SCNC: 107 MMOL/L (ref 98–112)
CO2 SERPL-SCNC: 25 MMOL/L (ref 21–32)
CREAT BLD-MCNC: 0.91 MG/DL
EGFRCR SERPLBLD CKD-EPI 2021: 85 ML/MIN/1.73M2 (ref 60–?)
EOSINOPHIL # BLD AUTO: 0.19 X10(3) UL (ref 0–0.7)
EOSINOPHIL NFR BLD AUTO: 3.6 %
ERYTHROCYTE [DISTWIDTH] IN BLOOD BY AUTOMATED COUNT: 13.2 %
FASTING STATUS PATIENT QL REPORTED: NO
GLOBULIN PLAS-MCNC: 3.6 G/DL (ref 2.8–4.4)
GLUCOSE BLD-MCNC: 135 MG/DL (ref 70–99)
HCT VFR BLD AUTO: 38.3 %
HGB BLD-MCNC: 13.1 G/DL
IMM GRANULOCYTES # BLD AUTO: 0.02 X10(3) UL (ref 0–1)
IMM GRANULOCYTES NFR BLD: 0.4 %
LYMPHOCYTES # BLD AUTO: 0.91 X10(3) UL (ref 1–4)
LYMPHOCYTES NFR BLD AUTO: 17.1 %
MCH RBC QN AUTO: 32.8 PG (ref 26–34)
MCHC RBC AUTO-ENTMCNC: 34.2 G/DL (ref 31–37)
MCV RBC AUTO: 96 FL
MONOCYTES # BLD AUTO: 0.72 X10(3) UL (ref 0.1–1)
MONOCYTES NFR BLD AUTO: 13.5 %
NEUTROPHILS # BLD AUTO: 3.44 X10 (3) UL (ref 1.5–7.7)
NEUTROPHILS # BLD AUTO: 3.44 X10(3) UL (ref 1.5–7.7)
NEUTROPHILS NFR BLD AUTO: 64.6 %
OSMOLALITY SERPL CALC.SUM OF ELEC: 289 MOSM/KG (ref 275–295)
PLATELET # BLD AUTO: 172 10(3)UL (ref 150–450)
POTASSIUM SERPL-SCNC: 4 MMOL/L (ref 3.5–5.1)
PROT SERPL-MCNC: 6.6 G/DL (ref 6.4–8.2)
RBC # BLD AUTO: 3.99 X10(6)UL
SODIUM SERPL-SCNC: 137 MMOL/L (ref 136–145)
WBC # BLD AUTO: 5.3 X10(3) UL (ref 4–11)

## 2023-09-06 PROCEDURE — 96375 TX/PRO/DX INJ NEW DRUG ADDON: CPT

## 2023-09-06 PROCEDURE — 96413 CHEMO IV INFUSION 1 HR: CPT

## 2023-09-06 PROCEDURE — 99214 OFFICE O/P EST MOD 30 MIN: CPT | Performed by: INTERNAL MEDICINE

## 2023-09-06 PROCEDURE — 96417 CHEMO IV INFUS EACH ADDL SEQ: CPT

## 2023-09-06 NOTE — PROGRESS NOTES
Outpatient Oncology Care Plan   Problem list:   fatigue   Problems related to:   side effect of treatment   Interventions:   provided general teaching   Expected outcomes:   understands plan of care   Progress towards outcome: making progress     Education Record   Learner: Patient   Barriers / Limitations: None   Method: Discussion   Outcome: Shows understanding   Comments:  Patient here for follow-up and planned C1D1 treatment. Had his port placed yesterday. Denies any additional changes since last week. Anti-emetic regimen discussed with patient.

## 2023-09-06 NOTE — PROGRESS NOTES
Pt here for C1D1 Abraxane/Gemzar. Arrives Ambulating independently, accompanied by Spouse       Oral medications included in this regimen:  no    Patient confirms comprehension of cancer treatment schedule:  yes    Pregnancy screening:  Not applicable    Modifications in dose or schedule:  No    Medications appearance and physical integrity checked by RN. Chemotherapy IV pump settings verified by 2 RNs:  yes     Frequency of blood return and site check throughout administration: Prior to administration and At completion of therapy     Infusion/treatment outcome:  patient tolerated treatment without incident    Education Record    Learner:  Patient and Spouse  Barriers / Limitations:  None  Method:  Brief focused and Reinforcement  Education / instructions given:  Plan of care reviewed. Advised on when to take oral anti-emetics. Call for any issues/questions/concerns. Outcome:  Shows understanding    Discharged Home, Ambulating independently, accompanied by:Spouse    Patient/family verbalized understanding of future appointments: by printed AVS  Patient instructed to keep the port clean and dry. Shagufta Carlos to shower, but just dab the port site dry after the shower. Call for any issues/questions/concerns. Patient verbalized understanding. Discharged in good condition.

## 2023-09-07 ENCOUNTER — TELEPHONE (OUTPATIENT)
Dept: HEMATOLOGY/ONCOLOGY | Facility: HOSPITAL | Age: 78
End: 2023-09-07

## 2023-09-07 NOTE — TELEPHONE ENCOUNTER
Toxicities: C1 D1 Album-bound Paclitaxel/Gemcitabine on 9/6/2023    Pema Garcia reports that he felt tired after he got home. He was able to eat and drink well. He slept well. He had a good breakfast this morning. No side effects at this time. He did report he has a new portacath that was placed on 9/5/2023. He was told not to get it wet. He got the gauze dressing wet this morning, when he cleaned up for the day. He changed the guaze. I asked him to please call the office if he is not feeling well or has any questions or concerns. He agreed and thanked me for checking on him.

## 2023-09-07 NOTE — TELEPHONE ENCOUNTER
Lalo Begun  calling with [employer]. Requesting the Genetic Testing report. Do not see any genetic Provider, or Genetic test  in Patients Chart.     She requested to speak with a nurse

## 2023-09-08 ENCOUNTER — HOSPITAL ENCOUNTER (OUTPATIENT)
Dept: CT IMAGING | Age: 78
Discharge: HOME | End: 2023-09-08
Attending: INTERNAL MEDICINE
Payer: MEDICARE

## 2023-09-08 DIAGNOSIS — C25.9 MALIGNANT NEOPLASM OF PANCREAS, UNSPECIFIED LOCATION OF MALIGNANCY (HCC): ICD-10-CM

## 2023-09-08 PROCEDURE — 71250 CT THORAX DX C-: CPT | Performed by: INTERNAL MEDICINE

## 2023-09-13 ENCOUNTER — OFFICE VISIT (OUTPATIENT)
Dept: HEMATOLOGY/ONCOLOGY | Facility: HOSPITAL | Age: 78
End: 2023-09-13
Attending: INTERNAL MEDICINE
Payer: MEDICARE

## 2023-09-13 VITALS
OXYGEN SATURATION: 98 % | BODY MASS INDEX: 27.2 KG/M2 | HEART RATE: 77 BPM | HEIGHT: 71.69 IN | SYSTOLIC BLOOD PRESSURE: 114 MMHG | TEMPERATURE: 97 F | DIASTOLIC BLOOD PRESSURE: 68 MMHG | RESPIRATION RATE: 16 BRPM | WEIGHT: 198.63 LBS

## 2023-09-13 DIAGNOSIS — D69.6 THROMBOCYTOPENIA (HCC): ICD-10-CM

## 2023-09-13 DIAGNOSIS — C78.7 PANCREATIC CARCINOMA METASTATIC TO LIVER (HCC): Primary | ICD-10-CM

## 2023-09-13 DIAGNOSIS — C25.9 MALIGNANT NEOPLASM OF PANCREAS (HCC): Primary | ICD-10-CM

## 2023-09-13 DIAGNOSIS — C25.9 MALIGNANT NEOPLASM OF PANCREAS, UNSPECIFIED LOCATION OF MALIGNANCY (HCC): ICD-10-CM

## 2023-09-13 DIAGNOSIS — C25.9 PANCREATIC CARCINOMA METASTATIC TO LIVER (HCC): Primary | ICD-10-CM

## 2023-09-13 LAB
ALBUMIN SERPL-MCNC: 3.2 G/DL (ref 3.4–5)
ALBUMIN/GLOB SERPL: 0.9 {RATIO} (ref 1–2)
ALP LIVER SERPL-CCNC: 97 U/L
ALT SERPL-CCNC: 35 U/L
ANION GAP SERPL CALC-SCNC: 2 MMOL/L (ref 0–18)
AST SERPL-CCNC: 17 U/L (ref 15–37)
BASOPHILS # BLD AUTO: 0.02 X10(3) UL (ref 0–0.2)
BASOPHILS NFR BLD AUTO: 0.7 %
BILIRUB SERPL-MCNC: 0.7 MG/DL (ref 0.1–2)
BUN BLD-MCNC: 18 MG/DL (ref 7–18)
CALCIUM BLD-MCNC: 8.7 MG/DL (ref 8.5–10.1)
CHLORIDE SERPL-SCNC: 108 MMOL/L (ref 98–112)
CO2 SERPL-SCNC: 27 MMOL/L (ref 21–32)
CREAT BLD-MCNC: 0.88 MG/DL
EGFRCR SERPLBLD CKD-EPI 2021: 87 ML/MIN/1.73M2 (ref 60–?)
EOSINOPHIL # BLD AUTO: 0.18 X10(3) UL (ref 0–0.7)
EOSINOPHIL NFR BLD AUTO: 5.9 %
ERYTHROCYTE [DISTWIDTH] IN BLOOD BY AUTOMATED COUNT: 13.1 %
FASTING STATUS PATIENT QL REPORTED: NO
GLOBULIN PLAS-MCNC: 3.4 G/DL (ref 2.8–4.4)
GLUCOSE BLD-MCNC: 112 MG/DL (ref 70–99)
HCT VFR BLD AUTO: 36.4 %
HGB BLD-MCNC: 12.5 G/DL
IMM GRANULOCYTES # BLD AUTO: 0.01 X10(3) UL (ref 0–1)
IMM GRANULOCYTES NFR BLD: 0.3 %
LYMPHOCYTES # BLD AUTO: 0.98 X10(3) UL (ref 1–4)
LYMPHOCYTES NFR BLD AUTO: 32.2 %
MCH RBC QN AUTO: 32.6 PG (ref 26–34)
MCHC RBC AUTO-ENTMCNC: 34.3 G/DL (ref 31–37)
MCV RBC AUTO: 94.8 FL
MONOCYTES # BLD AUTO: 0.24 X10(3) UL (ref 0.1–1)
MONOCYTES NFR BLD AUTO: 7.9 %
NEUTROPHILS # BLD AUTO: 1.61 X10 (3) UL (ref 1.5–7.7)
NEUTROPHILS # BLD AUTO: 1.61 X10(3) UL (ref 1.5–7.7)
NEUTROPHILS NFR BLD AUTO: 53 %
OSMOLALITY SERPL CALC.SUM OF ELEC: 287 MOSM/KG (ref 275–295)
PLATELET # BLD AUTO: 107 10(3)UL (ref 150–450)
POTASSIUM SERPL-SCNC: 4.1 MMOL/L (ref 3.5–5.1)
PROT SERPL-MCNC: 6.6 G/DL (ref 6.4–8.2)
RBC # BLD AUTO: 3.84 X10(6)UL
SODIUM SERPL-SCNC: 137 MMOL/L (ref 136–145)
WBC # BLD AUTO: 3 X10(3) UL (ref 4–11)

## 2023-09-13 PROCEDURE — 80053 COMPREHEN METABOLIC PANEL: CPT

## 2023-09-13 PROCEDURE — 96417 CHEMO IV INFUS EACH ADDL SEQ: CPT

## 2023-09-13 PROCEDURE — 85025 COMPLETE CBC W/AUTO DIFF WBC: CPT

## 2023-09-13 PROCEDURE — 96375 TX/PRO/DX INJ NEW DRUG ADDON: CPT

## 2023-09-13 PROCEDURE — 96413 CHEMO IV INFUSION 1 HR: CPT

## 2023-09-13 PROCEDURE — 99215 OFFICE O/P EST HI 40 MIN: CPT | Performed by: NURSE PRACTITIONER

## 2023-09-14 ENCOUNTER — DOCUMENTATION ONLY (OUTPATIENT)
Dept: HEMATOLOGY/ONCOLOGY | Facility: HOSPITAL | Age: 78
End: 2023-09-14

## 2023-09-20 ENCOUNTER — OFFICE VISIT (OUTPATIENT)
Dept: HEMATOLOGY/ONCOLOGY | Facility: HOSPITAL | Age: 78
End: 2023-09-20
Attending: INTERNAL MEDICINE
Payer: MEDICARE

## 2023-09-20 VITALS
WEIGHT: 198 LBS | SYSTOLIC BLOOD PRESSURE: 146 MMHG | HEIGHT: 71.69 IN | TEMPERATURE: 98 F | RESPIRATION RATE: 16 BRPM | DIASTOLIC BLOOD PRESSURE: 70 MMHG | HEART RATE: 77 BPM | BODY MASS INDEX: 27.11 KG/M2 | OXYGEN SATURATION: 98 %

## 2023-09-20 DIAGNOSIS — C25.9 MALIGNANT NEOPLASM OF PANCREAS, UNSPECIFIED LOCATION OF MALIGNANCY (HCC): Primary | ICD-10-CM

## 2023-09-20 LAB
BASOPHILS # BLD AUTO: 0.01 X10(3) UL (ref 0–0.2)
BASOPHILS NFR BLD AUTO: 0.6 %
EOSINOPHIL # BLD AUTO: 0.03 X10(3) UL (ref 0–0.7)
EOSINOPHIL NFR BLD AUTO: 1.7 %
ERYTHROCYTE [DISTWIDTH] IN BLOOD BY AUTOMATED COUNT: 12.7 %
HCT VFR BLD AUTO: 34.9 %
HGB BLD-MCNC: 11.9 G/DL
IMM GRANULOCYTES # BLD AUTO: 0.01 X10(3) UL (ref 0–1)
IMM GRANULOCYTES NFR BLD: 0.6 %
LYMPHOCYTES # BLD AUTO: 0.92 X10(3) UL (ref 1–4)
LYMPHOCYTES NFR BLD AUTO: 50.8 %
MCH RBC QN AUTO: 32.6 PG (ref 26–34)
MCHC RBC AUTO-ENTMCNC: 34.1 G/DL (ref 31–37)
MCV RBC AUTO: 95.6 FL
MONOCYTES # BLD AUTO: 0.26 X10(3) UL (ref 0.1–1)
MONOCYTES NFR BLD AUTO: 14.4 %
NEUTROPHILS # BLD AUTO: 0.58 X10 (3) UL (ref 1.5–7.7)
NEUTROPHILS # BLD AUTO: 0.58 X10(3) UL (ref 1.5–7.7)
NEUTROPHILS NFR BLD AUTO: 31.9 %
PLATELET # BLD AUTO: 66 10(3)UL (ref 150–450)
RBC # BLD AUTO: 3.65 X10(6)UL
WBC # BLD AUTO: 1.8 X10(3) UL (ref 4–11)

## 2023-09-20 PROCEDURE — 85025 COMPLETE CBC W/AUTO DIFF WBC: CPT

## 2023-09-20 PROCEDURE — 36591 DRAW BLOOD OFF VENOUS DEVICE: CPT

## 2023-09-20 NOTE — PROGRESS NOTES
Education Record    Learner:  Patient and Spouse    Disease / Diagnosis:ANC 0.58, platelets 66     Barriers / Limitations:  None   Comments:    Method:  Discussion   Comments:    General Topics:  Plan of care reviewed   Comments:    Outcome:  Shows understanding   Comments:No treatment today due to anc and platelets. Instructed patient on neutropenic precautions and to go to er if has uncontrolled bleeding. Patient stated had a nose bleed this morning but stopped right away. Instructed to call with any fevers. Verbalizes understanding. Next appt in place.

## 2023-09-21 NOTE — PROGRESS NOTES
Oncology Nutrition Consultation    Patient Name: Regina Sanabria  YOB: 1945  Medical Record Number: [id number]   Account Number: [de-identified]  Dietitian: Rico Deng RD, ALEJANDRO    Date of visit: 9/20/2023    Diet Rx: high protein/calorie, quality as tolerated    Pertinent Dx/PMH: metastatic pancreatic (tail) cancer    Past Medical History:   Diagnosis Date    Cancer ([facility] [address] 2023    squamous cell - left hand    Disseminated zoster 11/25/2013    Diverticulosis of large intestine     Esophageal reflux     only occassional    Hearing impairment     High cholesterol     HYPERLIPIDEMIA     Hyperlipidemia Rx: simvastatin, calcium score 0 (8/2018) 04/14/2015    OTHER DISEASES     colon polyp marissa     Renal disorder     ckd       TX: [place]/abraxane    Other pertinent subjective/objective information:    Pertinent Meds:    Current Outpatient Medications:     carbamide peroxide 6.5 % Otic Solution, Place 5 drops in ear(s) 2 (two) times daily. , Disp: , Rfl:     prochlorperazine (COMPAZINE) 10 mg tablet, Take 1 tablet (10 mg total) by mouth every 6 (six) hours as needed for Nausea., Disp: 30 tablet, Rfl: 3    ondansetron (ZOFRAN) 8 MG tablet, Take 1 tablet (8 mg total) by mouth every 8 (eight) hours as needed for Nausea., Disp: 30 tablet, Rfl: 3    metFORMIN  MG Oral Tablet 24 Hr, Take 1 tablet (500 mg total) by mouth daily with breakfast., Disp: , Rfl:     Cholecalciferol (VITAMIN D) 50 MCG (2000 UT) Oral Cap, , Disp: , Rfl:     simvastatin (ZOCOR) 40 MG Oral Tab, Take 1 tablet (40 mg total) by mouth daily. , Disp: , Rfl:     Pertinent Labs: noted    Height: 5'11.7\" (6'3\" per pt)            IBW: 180-190  +/- 10%    WT HX:   Wt Readings from Last 9 Encounters:  09/20/23 : 89.8 kg (198 lb)  09/13/23 : 90.1 kg (198 lb 9.6 oz)  09/06/23 : 90.4 kg (199 lb 6.4 oz)  08/29/23 : 89.7 kg (197 lb 12.8 oz)  08/17/23 : 92.1 kg (203 lb 0.7 oz)  08/08/23 : 92.5 kg (203 lb 14.8 oz)  08/11/21 : 99.8 kg (220 lb)  07/27/20 : 99.3 kg (219 lb)  05/05/20 : 102.1 kg (225 lb)      Estimated Nutrition Needs: 25-27 kcals/kg = 6462-9093 KCALS/d; 1.5 gms protein/kg = 135 gms/d    Services Provided: Verbal ix provided addressing -  importance of nutrition during tx; potential nutrition related side effects of tx and ways to address    Assessment/Plan: RD met w/ this pleasant, talkative, [de-identified] y/o male and his wife in tx room for introduction, assessment and recommendations. Pt noted tolerating tx well aside from bouts of diarrhea up to 3x/d of small volume. Pt stated he drinks Gatorade and water w/ some hot tea throughout the day. Pt attributes recent wt loss \"to cancer\" as well as some volitional.     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 Pt noted spouse recovering from back surgery, so they have been doing carry out more. Diet hx revealed a bowl of Cheerios/Chex/Raisin Bran cereal, banana and orange juice for breakfast; [employer] cheeseburger for lunch; balanced dinner. Pt noted he does not snack. RD reviewed recommendations as noted encouraging pt increase his protein intake offering suggestions. Pt noted he does like cheese/peanut butter/nuts and can incorporate as snacks. Both pt/spouse verbalized understanding of recommendations made. RD offered support. Thank you for allowing me to participate in the care of Ariana Eid. RD will continue to follow. The [facility] makes medical notes like these available to patients in the interest of transparency. Please be advised this is a medical document. Medical documents are intended to carry relevant information, facts as evident, and the clinical opinion of the practitioner. The medical note is intended as peer to peer communication and may appear blunt or direct. It is written in medical language and may contain abbreviations or verbiage that are unfamiliar.

## 2023-10-03 NOTE — PROGRESS NOTES
[facility]    Diagnosis: Metastatic Pancreatic Cancer     Treatment History:   [place]/Abraxane 9/6/23    Visit Diagnosis:  Malignant neoplasm of pancreas, unspecified location of malignancy ([address]  (primary encounter diagnosis)    History of Present Illness: 78M with a PMH of HLD and GERD is here to discuss metastatic pancreatic cancer. HE is now on Gemcitabine + Abraxane. Oncology History  -He initially noticed 8 weeks of upper abdominal pain with decreased appetite. Associated weight loss of 20 lb. 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 No FH of cancer. No smoking or ETOH abuse. He is here with his wife Robert Vaughn. They have one daughter. -CT AP at [facility] on 8/2/23: 1. Large hypodense mass at the pancreatic tail, with suspected direct invasion of the wall of the proximal gastric body, the splenic flexure of the colon, and possibly the splenic hilum. 2.  Hepatic metastases. 3.  Hypodense lesions in the spleen, suspicious for metastatic disease. 4.  Pulmonary nodules. Metastatic disease cannot be excluded. 5.  Small nonobstructing left renal calculus.     -Liver biopsy at [facility] on 8/15/23: On immunohistochemistry, the tumor cells are positive for CK7 and CK20. They show focal weak positivity with CDX2. They are negative for SATB2, TTF-1, NKX 3 and PAX8. The histologic findings are consistent with adenocarcinoma. The immunohistochemical profile is not entirely specific, but could be compatible with the clinical impression of metastatic pancreatic adenocarcinoma. Clinical and imaging correlation is suggested. -CT chest from 9/8/23: non specific lung nodules in LLL, < 5 mm    -[place]/abraxane  C1: 9/6/23:  76. Day 15 omitted due to cytopenias  C2: 10/4/23    Interval History:  -Met with Dr. Lazarus Apley on 9/18/23  -Tolerated C1 well. Had mild loose stools. Mild numbness in feet. Energy is good. Belly pain is slightly better.      Review of Systems: 12 Point ROS was completed and pertinent positives are in the HPI    carbamide peroxide 6.5 % Otic Solution, Place 5 drops in ear(s) 2 (two) times daily. , Disp: , Rfl:   prochlorperazine (COMPAZINE) 10 mg tablet, Take 1 tablet (10 mg total) by mouth every 6 (six) hours as needed for Nausea., Disp: 30 tablet, Rfl: 3  ondansetron (ZOFRAN) 8 MG tablet, Take 1 tablet (8 mg total) by mouth every 8 (eight) hours as needed for Nausea., Disp: 30 tablet, Rfl: 3  metFORMIN  MG Oral Tablet 24 Hr, Take 1 tablet (500 mg total) by mouth daily with breakfast., Disp: , Rfl:   Cholecalciferol (VITAMIN D) 50 MCG (2000 UT) Oral Cap, , Disp: , Rfl:   simvastatin (ZOCOR) 40 MG Oral Tab, Take 1 tablet (40 mg total) by mouth daily. , Disp: , Rfl:     [COMPLETED] ondansetron (Zofran) 8 mg, dexAMETHasone (Decadron) 10 mg in sodium chloride 0.9% 105 mL IVPB, , Intravenous, Once, Enmanuel Other, APRN, Stopped at 09/13/23 1209  [COMPLETED] PACLitaxel protein bound particles (Abraxane) 125 mg/m2 = 265 mg in 53 mL infusion, 125 mg/m2 (Treatment Plan Recorded), Intravenous, Once, Enmanuel Other, APRN, Stopped at 09/13/23 1317  [COMPLETED] gemcitabine (Gemzar) 2,128 mg in sodium chloride 0.9% 306 mL infusion, 1,000 mg/m2 (Treatment Plan Recorded), Intravenous, Once, Enmanuel Other, APRN, Stopped at 09/13/23 1355  [COMPLETED] ondansetron (Zofran) 8 mg, dexAMETHasone (Decadron) 10 mg in sodium chloride 0.9% 105 mL IVPB, , Intravenous, Once, Jacky Cushing, MD, Stopped at 09/06/23 1150  [COMPLETED] PACLitaxel protein bound particles (Abraxane) 125 mg/m2 = 265 mg in 53 mL infusion, 125 mg/m2 (Treatment Plan), Intravenous, Once, Jacky Cushing, MD, Stopped at 09/06/23 1239  [COMPLETED] gemcitabine (Gemzar) 2,014 mg in sodium chloride 0.9% 303 mL infusion, 1,000 mg/m2 (Order-Specific), Intravenous, Once, Jacky Cushing, MD, Stopped at 09/06/23 1336  [COMPLETED] lidocaine-EPINEPHrine (Xylocaine-Epinephrine) 2 %-1:273021 injection, , , ,   [PVFWWUPMK] fentaNYL (Sublimaze) 50 mcg/mL injection, , , ,   [COMPLETED] midazolam (Versed) 2 MG/2ML injection, , , ,   [COMPLETED] heparin (Porcine) 100 Units/mL lock flush, , , ,   [COMPLETED] heparin (Porcine) 5000 UNIT/ML injection, , , ,   [COMPLETED] lidocaine (Xylocaine) 1 % injection, , , ,   [COMPLETED] ceFAZolin (Ancef) 2 g/20mL IV syringe premix, , , ,   [COMPLETED] fentaNYL (Sublimaze) 50 mcg/mL injection, , , ,       Past Medical History:   Diagnosis Date    Cancer ([facility] [address] 2023    squamous cell - left hand    Disseminated zoster 11/25/2013    Diverticulosis of large intestine     Esophageal reflux     only occassional    Hearing impairment     High cholesterol     HYPERLIPIDEMIA     Hyperlipidemia Rx: simvastatin, calcium score 0 (8/2018) 04/14/2015    OTHER DISEASES     colon polyp marissa     Renal disorder     ckd     Past Surgical History:   Procedure Laterality Date    COLONOSCOPY      COLONOSCOPY N/A 5/3/2017    Procedure: COLONOSCOPY;  Surgeon: Raquel Cintron MD;  Location: [address] [place] [address] ENDOSCOPY    SKIN SURGERY      dermatologist- [facility]     Social History    Socioeconomic History      Marital status:     Tobacco Use      Smoking status: Never      Smokeless tobacco: Never    Vaping Use      Vaping Use: Never used    Substance and Sexual Activity      Alcohol use: No      Drug use: No     Family History   Problem Relation Age of Onset    Heart Disorder Father         cad/acbg/cva/valve    Other Mother         copd/aaa    Hypertension Son     Other Brother         bad back       Physical Exam  Height: --  Weight: --  BSA (Calculated - sq m): --  Pulse: --  BP: --  Temp: --  Do Not Use - Resp Rate: --  SpO2: --    General: NAD, AOX3  HEENT: clear op, mmm, no jvd, no scleral icterus  CV: RR  Extremities: No edema   Lungs: no increased work of breathing  Abd: non-distended no hepatosplenomegaly  Neuro: CN: II-XII grossly intact    Results:  Lab Results   Component Value Date    WBC 1.8 (L) 09/20/2023    HGB 11.9 (L) 09/20/2023    HCT 34.9 (L) 09/20/2023    MCV 95.6 09/20/2023    PLT 66.0 (L) 09/20/2023     Lab Results   Component Value Date     09/13/2023    K 4.1 09/13/2023    CO2 27.0 09/13/2023     09/13/2023    BUN 18 09/13/2023    GLUCOSE 97 04/10/2015    ALB 3.2 (L) 09/13/2023       No results found for: \"LDH\"    Radiology: reviewed     Pathology: reviewed     Assessment and Plan:  Metastatic Pancreatic Cancer  -[place] MSI, TMB 52nd percentile, Xg negative, KRAS G12V. No actionable mutations   -CBC, CMP,   -Discussed [place]/Abraxane vs. Ex[place] alone vs. mFOLFIRINOX. We discussed that all treatment is palliative. Given age and side effect profile, we opted for [place]/Abraxane. C2 today. We will DR Exmore to 750 mg/m2 due to cytopenias.   -Repeat imaging after C3    APN in 2 weeks     EUGENIO Cortes [facility]

## 2023-10-04 ENCOUNTER — OFFICE VISIT (OUTPATIENT)
Dept: HEMATOLOGY/ONCOLOGY | Facility: HOSPITAL | Age: 78
End: 2023-10-04
Attending: INTERNAL MEDICINE
Payer: MEDICARE

## 2023-10-04 VITALS
SYSTOLIC BLOOD PRESSURE: 120 MMHG | HEIGHT: 71.69 IN | BODY MASS INDEX: 27.22 KG/M2 | HEART RATE: 79 BPM | RESPIRATION RATE: 18 BRPM | WEIGHT: 198.81 LBS | TEMPERATURE: 97 F | OXYGEN SATURATION: 96 % | DIASTOLIC BLOOD PRESSURE: 67 MMHG

## 2023-10-04 DIAGNOSIS — C25.9 MALIGNANT NEOPLASM OF PANCREAS, UNSPECIFIED LOCATION OF MALIGNANCY (HCC): Primary | ICD-10-CM

## 2023-10-04 LAB
ALBUMIN SERPL-MCNC: 3.2 G/DL (ref 3.4–5)
ALBUMIN/GLOB SERPL: 1 {RATIO} (ref 1–2)
ALP LIVER SERPL-CCNC: 92 U/L
ALT SERPL-CCNC: 40 U/L
ANION GAP SERPL CALC-SCNC: 4 MMOL/L (ref 0–18)
AST SERPL-CCNC: 22 U/L (ref 15–37)
BASOPHILS # BLD AUTO: 0.08 X10(3) UL (ref 0–0.2)
BASOPHILS NFR BLD AUTO: 1.4 %
BILIRUB SERPL-MCNC: 0.8 MG/DL (ref 0.1–2)
BUN BLD-MCNC: 20 MG/DL (ref 7–18)
CALCIUM BLD-MCNC: 8.7 MG/DL (ref 8.5–10.1)
CANCER AG19-9 SERPL-ACNC: 61.6 U/ML (ref ?–37)
CHLORIDE SERPL-SCNC: 109 MMOL/L (ref 98–112)
CO2 SERPL-SCNC: 27 MMOL/L (ref 21–32)
CREAT BLD-MCNC: 1.21 MG/DL
EGFRCR SERPLBLD CKD-EPI 2021: 61 ML/MIN/1.73M2 (ref 60–?)
EOSINOPHIL # BLD AUTO: 0.22 X10(3) UL (ref 0–0.7)
EOSINOPHIL NFR BLD AUTO: 4 %
ERYTHROCYTE [DISTWIDTH] IN BLOOD BY AUTOMATED COUNT: 14.6 %
FASTING STATUS PATIENT QL REPORTED: NO
GLOBULIN PLAS-MCNC: 3.3 G/DL (ref 2.8–4.4)
GLUCOSE BLD-MCNC: 163 MG/DL (ref 70–99)
HCT VFR BLD AUTO: 36.7 %
HGB BLD-MCNC: 12.4 G/DL
IMM GRANULOCYTES # BLD AUTO: 0.05 X10(3) UL (ref 0–1)
IMM GRANULOCYTES NFR BLD: 0.9 %
LYMPHOCYTES # BLD AUTO: 0.87 X10(3) UL (ref 1–4)
LYMPHOCYTES NFR BLD AUTO: 15.6 %
MCH RBC QN AUTO: 33.2 PG (ref 26–34)
MCHC RBC AUTO-ENTMCNC: 33.8 G/DL (ref 31–37)
MCV RBC AUTO: 98.4 FL
MONOCYTES # BLD AUTO: 1.03 X10(3) UL (ref 0.1–1)
MONOCYTES NFR BLD AUTO: 18.5 %
NEUTROPHILS # BLD AUTO: 3.31 X10 (3) UL (ref 1.5–7.7)
NEUTROPHILS # BLD AUTO: 3.31 X10(3) UL (ref 1.5–7.7)
NEUTROPHILS NFR BLD AUTO: 59.6 %
OSMOLALITY SERPL CALC.SUM OF ELEC: 296 MOSM/KG (ref 275–295)
PLATELET # BLD AUTO: 256 10(3)UL (ref 150–450)
POTASSIUM SERPL-SCNC: 4.2 MMOL/L (ref 3.5–5.1)
PROT SERPL-MCNC: 6.5 G/DL (ref 6.4–8.2)
RBC # BLD AUTO: 3.73 X10(6)UL
SODIUM SERPL-SCNC: 140 MMOL/L (ref 136–145)
WBC # BLD AUTO: 5.6 X10(3) UL (ref 4–11)

## 2023-10-04 PROCEDURE — 99215 OFFICE O/P EST HI 40 MIN: CPT | Performed by: INTERNAL MEDICINE

## 2023-10-04 PROCEDURE — 86301 IMMUNOASSAY TUMOR CA 19-9: CPT

## 2023-10-04 PROCEDURE — 96417 CHEMO IV INFUS EACH ADDL SEQ: CPT

## 2023-10-04 PROCEDURE — 85025 COMPLETE CBC W/AUTO DIFF WBC: CPT

## 2023-10-04 PROCEDURE — 96375 TX/PRO/DX INJ NEW DRUG ADDON: CPT

## 2023-10-04 PROCEDURE — 96413 CHEMO IV INFUSION 1 HR: CPT

## 2023-10-04 PROCEDURE — 80053 COMPREHEN METABOLIC PANEL: CPT

## 2023-10-04 NOTE — PROGRESS NOTES
Pt here for C2D1 Abraxane/Gemzar. Arrives Ambulating independently, accompanied by Spouse       Oral medications included in this regimen:  no    Patient confirms comprehension of cancer treatment schedule:  yes    Pregnancy screening:  Not applicable    Modifications in dose or schedule:  Yes Gemzar dose reduced due to thrombocytopenia    Medications appearance and physical integrity checked by RN. Chemotherapy IV pump settings verified by 2 RNs:  yes     Frequency of blood return and site check throughout administration: Prior to administration and At completion of therapy     Infusion/treatment outcome:  patient tolerated treatment without incident    Education Record    Learner:  Patient and Spouse  Barriers / Limitations:  None  Method:  Brief focused and Reinforcement  Education / instructions given:  Plan of care reviewed.   Outcome:  Shows understanding    Discharged Home, Ambulating independently, accompanied by:Spouse    Patient/family verbalized understanding of future appointments: by printed AVS

## 2023-10-04 NOTE — PROGRESS NOTES
Outpatient Oncology Care Plan   Problem list:   fatigue   Problems related to:   side effect of treatment   Interventions:   provided general teaching   Expected outcomes:   understands plan of care   Progress towards outcome: making progress     Education Record   Learner: Patient   Barriers / Limitations: None   Method: Discussion   Outcome: Shows understanding   Comments:  Patient here for follow-up and planned C1D15 treatment. States energy levels are still low. Denies any bleeding, bruising, petechiae, lightheadedness, or dyspnea. Still having some loose stool. Denies any additional symptoms at this time.

## 2023-10-04 NOTE — PROGRESS NOTES
Oncology Nutrition Consultation     Patient Name: Ga Whitlock  YOB: 1945  Medical Record Number: [id number]            Account Number: [de-identified]  Dietitian: Ros Rose RD, LDN     Date of visit: 10/4/2023     Diet Rx: high protein/calorie, quality as tolerated     Pertinent Dx/PMH: metastatic pancreatic (tail) cancer          Past Medical History:   Diagnosis Date    [facility] 2023     squamous cell - left hand    Disseminated zoster 11/25/2013    Diverticulosis of large intestine      Esophageal reflux       only occassional    Hearing impairment      High cholesterol      HYPERLIPIDEMIA      Hyperlipidemia Rx: simvastatin, calcium score 0 (8/2018) 04/14/2015    OTHER DISEASES       colon polyp marissa     Renal disorder       ckd         TX: [place]/abraxane     Other pertinent subjective/objective information:     Pertinent Meds:     Current Outpatient Medications:     carbamide peroxide 6.5 % Otic Solution, Place 5 drops in ear(s) 2 (two) times daily. , Disp: , Rfl:     prochlorperazine (COMPAZINE) 10 mg tablet, Take 1 tablet (10 mg total) by mouth every 6 (six) hours as needed for Nausea., Disp: 30 tablet, Rfl: 3    ondansetron (ZOFRAN) 8 MG tablet, Take 1 tablet (8 mg total) by mouth every 8 (eight) hours as needed for Nausea., Disp: 30 tablet, Rfl: 3    metFORMIN  MG Oral Tablet 24 Hr, Take 1 tablet (500 mg total) by mouth daily with breakfast., Disp: , Rfl:     Cholecalciferol (VITAMIN D) 50 MCG (2000 UT) Oral Cap, , Disp: , Rfl:     simvastatin (ZOCOR) 40 MG Oral Tab, Take 1 tablet (40 mg total) by mouth daily. , Disp: , Rfl:      Pertinent Labs: noted     Height: 5'11.7\" (6'3\" per pt)             IBW: 180-190  +/- 10%     WT HX:   10/04/23:  90.2 kg (198 lb 12.8 oz)  09/20/23 : 89.8 kg (198 lb)  09/13/23 : 90.1 kg (198 lb 9.6 oz)  09/06/23 : 90.4 kg (199 lb 6.4 oz)  08/29/23 : 89.7 kg (197 lb 12.8 oz)  08/17/23 : 92.1 kg (203 lb 0.7 oz)  08/08/23 : 92.5 kg (203 lb 14.8 oz) Estimated Nutrition Needs: 25-27 kcals/kg = 5136-2234 KCALS/d; 1.5 gms protein/kg = 135 gms/d     Assessment/Plan: RD f/u w/ pt and spouse in tx room today. Pt notes tolerating tx well. Still notes loose stool; however, pt notes manageable and not large volume or urgent. Pt noted having increased his protein intake by adding eggs to breakfast and doubling portion at lunch. Spouse concurred. Pt noted friend suggested he add Muscle Milk; however, pt stated he wanted to concentrate on adding whole foods rather than supplements. RD agreed. RD continues to offer support/encouragement. The [facility] makes medical notes like these available to patients in the interest of transparency. Please be advised this is a medical document. Medical documents are intended to carry relevant information, facts as evident, and the clinical opinion of the practitioner. The medical note is intended as peer to peer communication and may appear blunt or direct. It is written in medical language and may contain abbreviations or verbiage that are unfamiliar.

## 2023-10-09 ENCOUNTER — TELEPHONE (OUTPATIENT)
Dept: HEMATOLOGY/ONCOLOGY | Facility: HOSPITAL | Age: 78
End: 2023-10-09

## 2023-10-09 RX ORDER — HYDROCODONE BITARTRATE AND ACETAMINOPHEN 10; 325 MG/1; MG/1
1-2 TABLET ORAL EVERY 4 HOURS PRN
Qty: 120 TABLET | Refills: 0 | Status: SHIPPED | OUTPATIENT
Start: 2023-10-09

## 2023-10-09 NOTE — TELEPHONE ENCOUNTER
10/4/23 - C2D1 - Gemzar/Abraxane    Abdominal pain/decreased oral intake    Patient has been having right lower abdominal since Thursday. Fairly constant but can range in pain level of 4/10-10/10. Sometimes position change helps for short time other times no difference. Only taking Tylenol for pain management of which does not help. Denies fevers, no sob or chest pain, denies n/v/d. But oral intake decreased to not eating due to pain. Denies lightheadedness or dizziness. No urinary complaints. Please advise.

## 2023-10-09 NOTE — TELEPHONE ENCOUNTER
Called patient that [address] script sent to pharmacy for pain management. Discussed monitoring for constipation, sedition, caution with driving and activities until he is sure how it effects him. To call if pain continues and we will bring him in for ACV. He verbalizes understanding.

## 2023-10-09 NOTE — TELEPHONE ENCOUNTER
Patient has severe abdominal pain comes and goes. No appetite. Can't sleep through night because of pain. Please advise?

## 2023-10-10 NOTE — TELEPHONE ENCOUNTER
Esther Claudette [phone]  is calling about this medication HYDROcodone-acetaminophen  MG Oral Tab  and he cut the medication in half  and this still upset his stomach. Took  Prilosec and that helped a little bit . The pain really never went away but he was able to sleep much better. He also had two small bouts of Diarrhea. Denies nausea vomiting Fever. Wants to know If their is another medication he can take. Diarrhea seems to be under control now, maybe this was due to all the medication he was taking. Thanks [employer]

## 2023-10-10 NOTE — TELEPHONE ENCOUNTER
Toxicities: C2 D1 Albumin-bound Paclitaxel/Gemcitabine on 10/4/2023    Anorexia/Abdominal Pain/Constipation/Diarrhea    Anorexia: Grade 1 (Patient reports no appetite yesterday or today. He reports his abdominal pain increased with eating. He had an egg Mc Muffiin for breakfast, half a hamburger for lunch, half a hamburger and soup for dinner. Today he has eaten an [employer]. He denies nausea or vomiting.)  Abdominal Pain/Constipation:Grade 2/Diarrhea: Grade 1; (Patient reports that he had not had a bowel movement from 10/4/2023 until Sunday morning. He took a laxative on Saturday night. He had a medium, semi formed, brown bowel movement Sunday morning. He did not have one yesterday. He had two liquid, brown stools early this morning. No further bowel movement. No hematochezia or melena. He is passing gas yesterday and today. His abdominal pain yesterday and today is in his right lower abdomen. Bhupinder Buckner is was sharp and \"10/10. \" The pain did not radiate. Laying down or sitting in a semi reclined position helped decrease his pain. He took prilosec and it also helped Shelley timmons. \" He took 1/2 a tab of [place]  twice yesterday. He did not eat before taking the pain mediation. He reports his abdominal pain got worse when he took the pain medication. His abdominal pain now is currently \"2-3/10. \" He has not taken any pain medication today. Patient feels the pain medication is not working. )    I recommended Alejandro eat 5 to 6 small meals with protein. If he does not feel like eating solid food to try drinking an ensure or boost. I also recommended he eat before he takes his pain medication. He can try taking an anti nausea pill 45 minutes before he eats. He should then try eating and then take his pain medication. I also recommended he take colace daily and use Senokot-S or dulcolax prn for constipation. Betsey Mccarthy agreed to the plan. He will call the office if the pain medication does not work.

## 2023-10-11 ENCOUNTER — OFFICE VISIT (OUTPATIENT)
Dept: HEMATOLOGY/ONCOLOGY | Facility: HOSPITAL | Age: 78
End: 2023-10-11
Attending: INTERNAL MEDICINE
Payer: MEDICARE

## 2023-10-11 VITALS
HEART RATE: 113 BPM | TEMPERATURE: 97 F | OXYGEN SATURATION: 99 % | SYSTOLIC BLOOD PRESSURE: 137 MMHG | BODY MASS INDEX: 27.47 KG/M2 | HEIGHT: 71.69 IN | DIASTOLIC BLOOD PRESSURE: 74 MMHG | WEIGHT: 200.63 LBS

## 2023-10-11 DIAGNOSIS — C25.9 MALIGNANT NEOPLASM OF PANCREAS, UNSPECIFIED LOCATION OF MALIGNANCY (HCC): Primary | ICD-10-CM

## 2023-10-11 DIAGNOSIS — R19.7 DIARRHEA, UNSPECIFIED TYPE: ICD-10-CM

## 2023-10-11 DIAGNOSIS — C25.2 MALIGNANT NEOPLASM OF TAIL OF PANCREAS (HCC): Primary | ICD-10-CM

## 2023-10-11 DIAGNOSIS — R10.84 GENERALIZED ABDOMINAL PAIN: ICD-10-CM

## 2023-10-11 DIAGNOSIS — R10.31 RIGHT LOWER QUADRANT ABDOMINAL PAIN: ICD-10-CM

## 2023-10-11 LAB
BASOPHILS # BLD AUTO: 0.04 X10(3) UL (ref 0–0.2)
BASOPHILS NFR BLD AUTO: 0.9 %
EOSINOPHIL # BLD AUTO: 0.04 X10(3) UL (ref 0–0.7)
EOSINOPHIL NFR BLD AUTO: 0.9 %
ERYTHROCYTE [DISTWIDTH] IN BLOOD BY AUTOMATED COUNT: 13.7 %
HCT VFR BLD AUTO: 36.6 %
HGB BLD-MCNC: 12.7 G/DL
IMM GRANULOCYTES # BLD AUTO: 0.04 X10(3) UL (ref 0–1)
IMM GRANULOCYTES NFR BLD: 0.9 %
LYMPHOCYTES # BLD AUTO: 0.65 X10(3) UL (ref 1–4)
LYMPHOCYTES NFR BLD AUTO: 14.2 %
MCH RBC QN AUTO: 33.2 PG (ref 26–34)
MCHC RBC AUTO-ENTMCNC: 34.7 G/DL (ref 31–37)
MCV RBC AUTO: 95.8 FL
MONOCYTES # BLD AUTO: 1.37 X10(3) UL (ref 0.1–1)
MONOCYTES NFR BLD AUTO: 29.8 %
NEUTROPHILS # BLD AUTO: 2.45 X10 (3) UL (ref 1.5–7.7)
NEUTROPHILS # BLD AUTO: 2.45 X10(3) UL (ref 1.5–7.7)
NEUTROPHILS NFR BLD AUTO: 53.3 %
PLATELET # BLD AUTO: 112 10(3)UL (ref 150–450)
RBC # BLD AUTO: 3.82 X10(6)UL
WBC # BLD AUTO: 4.6 X10(3) UL (ref 4–11)

## 2023-10-11 PROCEDURE — 96413 CHEMO IV INFUSION 1 HR: CPT

## 2023-10-11 PROCEDURE — 96375 TX/PRO/DX INJ NEW DRUG ADDON: CPT

## 2023-10-11 PROCEDURE — 99215 OFFICE O/P EST HI 40 MIN: CPT | Performed by: CLINICAL NURSE SPECIALIST

## 2023-10-11 PROCEDURE — 85025 COMPLETE CBC W/AUTO DIFF WBC: CPT

## 2023-10-11 PROCEDURE — 96417 CHEMO IV INFUS EACH ADDL SEQ: CPT

## 2023-10-11 RX ORDER — DICYCLOMINE HCL 20 MG
20 TABLET ORAL 4 TIMES DAILY PRN
Qty: 30 TABLET | Refills: 0 | Status: ON HOLD | OUTPATIENT
Start: 2023-10-11

## 2023-10-11 NOTE — PROGRESS NOTES
Pt here for C2D8  of Abraxane/GHemzar. Arrives Ambulating independently, accompanied by Self and Spouse       Oral medications included in this regimen:  no    Patient confirms comprehension of cancer treatment schedule:  yes    Pregnancy screening:  Not applicable    Modifications in dose or schedule:  Yes    Medications appearance and physical integrity checked by RN. Chemotherapy IV pump settings verified by 2 RNs:  yes     Frequency of blood return and site check throughout administration: Prior to administration     Patient has been complaining of central abdominal pain x several days and he claims that he is not constipated.  He also says \" Shelly Henry is to harsh for my stomach\" Deirdre Thomas APN in to evaluate the patient    Infusion/treatment outcome:  patient tolerated treatment without incident    Education Record    Learner:  Patient and Spouse  Barriers / Limitations:  None  Method:  Brief focused  Education / instructions given:  patient  Outcome:  Shows understanding    Discharged Home, Ambulating independently, accompanied by:Self and Spouse    Patient/family verbalized understanding of future appointments: by printed AVS

## 2023-10-13 ENCOUNTER — LAB ENCOUNTER (OUTPATIENT)
Dept: LAB | Facility: HOSPITAL | Age: 78
End: 2023-10-13
Attending: CLINICAL NURSE SPECIALIST

## 2023-10-15 PROBLEM — T45.1X5A CHEMOTHERAPY INDUCED NEUTROPENIA: Status: ACTIVE | Noted: 2023-10-15

## 2023-10-15 PROBLEM — T45.1X5A CHEMOTHERAPY INDUCED NEUTROPENIA: Status: ACTIVE | Noted: 2023-01-01

## 2023-10-15 PROBLEM — C25.9 METASTASIS FROM PANCREATIC CANCER (HCC): Status: ACTIVE | Noted: 2023-10-15

## 2023-10-15 PROBLEM — E86.0 DEHYDRATION: Status: ACTIVE | Noted: 2023-10-15

## 2023-10-15 PROBLEM — A04.72 CLOSTRIDIUM DIFFICILE DIARRHEA: Status: ACTIVE | Noted: 2023-01-01

## 2023-10-15 PROBLEM — E83.51 HYPOCALCEMIA: Status: ACTIVE | Noted: 2023-10-15

## 2023-10-15 PROBLEM — R53.1 WEAKNESS GENERALIZED: Status: ACTIVE | Noted: 2023-10-15

## 2023-10-15 PROBLEM — D70.1 CHEMOTHERAPY INDUCED NEUTROPENIA: Status: ACTIVE | Noted: 2023-01-01

## 2023-10-15 PROBLEM — C79.9 METASTASIS FROM PANCREATIC CANCER (HCC): Status: ACTIVE | Noted: 2023-01-01

## 2023-10-15 PROBLEM — A04.72 CLOSTRIDIUM DIFFICILE DIARRHEA: Status: ACTIVE | Noted: 2023-10-15

## 2023-10-15 PROBLEM — C79.9 METASTASIS FROM PANCREATIC CANCER (HCC): Status: ACTIVE | Noted: 2023-10-15

## 2023-10-15 PROBLEM — D70.1 CHEMOTHERAPY INDUCED NEUTROPENIA: Status: ACTIVE | Noted: 2023-10-15

## 2023-10-15 PROBLEM — E86.0 DEHYDRATION: Status: ACTIVE | Noted: 2023-01-01

## 2023-10-15 PROBLEM — E83.51 HYPOCALCEMIA: Status: ACTIVE | Noted: 2023-01-01

## 2023-10-15 PROBLEM — R53.1 WEAKNESS GENERALIZED: Status: ACTIVE | Noted: 2023-01-01

## 2023-10-15 PROBLEM — C25.9 METASTASIS FROM PANCREATIC CANCER (HCC): Status: ACTIVE | Noted: 2023-01-01

## 2023-10-15 NOTE — ED QUICK NOTES
Orders for admission, patient is aware of plan and ready to go upstairs. Any questions, please call ED RN Abel Mclaughlin at extension [phone].      Patient Covid vaccination status: Fully vaccinated     COVID Test Ordered in ED: SARS-CoV-2/Flu A and B/RSV by PCR (GeneXpert)    COVID Suspicion at Admission: N/A    Running Infusions:      Mental Status/LOC at time of transport: A&Ox4    Other pertinent information:   CIWA score: N/A   NIH score:  N/A

## 2023-10-15 NOTE — PLAN OF CARE
Pt received A&Ox4. Tmax 102.7F, prn tylenol given. Dr. Vandana Arroyo and ID notified - sepsis BPA firing. Pt placed on tele per orders. VSS. Denies pain. Zofran given x1 for c/o nausea w/ relief. 3 watery BMs during shift, pt c/o abd cramping at times. Vanco, cefepime, and fluconazole given per MAR. Heme/onc consulted - started on nivestym per orders. IVF infusing @ 75ml/hr. Pt declined PT today. Fall and isolation precautions in place. Call light in reach.

## 2023-10-15 NOTE — ED QUICK NOTES
Rounding Completed. Plan of Care reviewed. Waiting for transport. Elimination needs assessed. Bed is locked and in lowest position. Call light within reach.

## 2023-10-15 NOTE — PROGRESS NOTES
NURSING ADMISSION NOTE      Patient admitted via Cart from ER, c/o generalized  weakness. Known  with pancreatic ca, chemo last Wednesday, loose stools from last week, last bowel movement  was 2 days ago,   Afebrile,  denies pain, Oriented to room. Neutropenic. Cdiff +  from 10/13/23 stool sample,  Safety precautions initiated. Bed in low position. Call light in reach. Plan  of care reviewed with the patient , PO abt for  cdiff, isolation, fall prec. Endorsed  to primary RN.

## 2023-10-15 NOTE — ED INITIAL ASSESSMENT (HPI)
78YM c/c of weakness Pt state that he received chemo on weds Pt state he been feeling weak since then Pt state that today he feeling weaker cannot get up and down his stairs

## 2023-10-16 NOTE — PLAN OF CARE
Pt A&Ox4 on room air, no complaints of pain. Tolerating medications well per mar, IV fluids infusing. IV abx and oral vanco. Having frequent bowel movements due to the Cdiff. Tribal has hearing aids at home. QID Accu check. VSS. Call light within reach, frequent checks made, needs met. 0040 pt temp 100.6, tylenol administered.      Problem: Diabetes/Glucose Control  Goal: Glucose maintained within prescribed range  Description: INTERVENTIONS:  - Monitor Blood Glucose as ordered  - Assess for signs and symptoms of hyperglycemia and hypoglycemia  - Administer ordered medications to maintain glucose within target range  - Assess barriers to adequate nutritional intake and initiate nutrition consult as needed  - Instruct patient on self management of diabetes  Outcome: Progressing     Problem: Patient/Family Goals  Goal: Patient/Family Long Term Goal  Description: Patient's Long Term Goal:     Interventions:  -   - See additional Care Plan goals for specific interventions  Outcome: Progressing  Goal: Patient/Family Short Term Goal  Description: Patient's Short Term Goal:     Interventions:   -   - See additional Care Plan goals for specific interventions  Outcome: Progressing

## 2023-10-16 NOTE — PHYSICAL THERAPY NOTE
Order received, chart reviewed. Pt reports that he is independent and has no concerns regarding functional mobility. Pt refusing to participate in therapy, declining therapy needs. Pt requesting PT not to return or re-attempt. Will sign off.      Maribeth Armas, PT  10/16/23

## 2023-10-16 NOTE — OCCUPATIONAL THERAPY NOTE
OT order received, chart reviewed, attempted eval. Pt declined need for therapy stating no concerns. RN states no concerns and that pt has been up and down IND as well as IND at home. OT will sign off at this time.  Please reorder if presenting with new concerns

## 2023-10-16 NOTE — PLAN OF CARE
Patient is A/O x4. VSS. Room air. Ambulatory. Voids. No complaints of pain. Regular diet; tolerating well. All medications given per [facility]. PIV saline locked. 1430: patient became febrile. Temp 101.9 PRN tylenol given. MD notified. All medications given per [facility]. IV abx given per MAR. Safety precautions in place. Call light within reach.   1719: temperature recheck 98.9

## 2023-10-16 NOTE — CONSULTS
[facility]    PATIENT'S NAME: Katherine Sheehan   ATTENDING PHYSICIAN: Dinora Masters. Lulu Pina M.D.   Claus Montiel: Lesley Dao M.D. PATIENT ACCOUNT#:   [de-identified]    LOCATION:  [address] [place] [address]  MEDICAL RECORD #:   [id number]       YOB: 1945  ADMISSION DATE:       10/14/2023      CONSULT DATE:  10/15/2023    REPORT OF CONSULTATION    HISTORY OF PRESENT ILLNESS:  This is an 67-year-old man who has a history of pancreatic cancer with metastasis. He received chemotherapy on October 11. This was the second round. His white count has dipped down, and he has been noted to have diarrhea and presents with those symptoms. Scans look like an ileus was developing, but he is passing diarrheal stools at this point. His C. difficile is positive and he is neutropenic, and I am asked to evaluate. No fevers are noted. He is able to tolerate some oral medications at this time. PAST MEDICAL HISTORY:  Squamous cell cancer on the left hand, disseminated zoster, diverticulosis, GERD, lipids, CKD. MEDICATIONS:  Neupogen and oral vancomycin. Other medications reviewed in Epic. ALLERGIES:  None. SOCIAL HISTORY:  Negative cigarettes and alcohol. FAMILY HISTORY:  Nobody else is ill. REVIEW OF SYSTEMS:  No URI symptoms. PHYSICAL EXAMINATION:    GENERAL:  This is a well-developed male, somewhat fatigued appearing but no acute distress. VITAL SIGNS:  He is afebrile. Currently, other vital signs are stable. HEENT:  Pale conjunctivae. No oral lesions. NECK:  Supple. No JVD or adenopathy. LUNGS:  Seem clear. HEART:  Normal S1 and S2. No S3, S4, or murmur. ABDOMEN:  Some distention but nontender. No masses, rebound, or organomegaly appreciated. EXTREMITIES:  No clubbing, cyanosis, edema, phlebitis, or cellulitis. SKIN:  Port site looks okay, not being used at the present time. LABORATORY DATA:  White count has dropped from 4.6 to 1.0, hemoglobin 11.7, platelets 94. ANC is 0.53. BUN and creatinine 22 and 1. 12. Lipase is normal.  ALT 42, bilirubin 1.3.  C. difficile is positive. Shiga toxin negative. Stool culture pending. Two blood cultures were ordered by me. Urinalysis essentially negative except for yeast in the urine. Obstructive series:  See the report. Air-fluid level seen. IMPRESSION:    1. Clostridium difficile colitis. Treatment will be continued with oral vancomycin, which I am happy to report he is able to tolerate. 2.   Evolving neutropenia despite not having fevers, rule out neutropenic infection. Blood cultures have been done, and I would elect to empirically give some cefepime. He is on Neupogen, so I would hope that the neutropenia would resolve quickly. 3.   Urinalysis has some yeast.  A short course of Diflucan will be given. 4.   These issues were discussed with the patient and his family at the bedside and nurse. Further suggestions to follow. Thank you very much for allowing me to see this patient. Dictated By Brenda Jaramillo.  Demarco Rea M.D.  d: 10/15/2023 12:50:44  t: 10/15/2023 14:38:51  [facility] [id number]  [facility]/

## 2023-10-17 NOTE — PLAN OF CARE
Pt A/O x4. VSS. Afebrile. Pt denies pain throughout day. Medications admin per MAR. Pt on contact iso precautions d/t c. Diff. Pt seen by ID today. Pt ambulated to bathroom safely today. Port accessed, dressing changed. Fall and safety precautions in place. Call light within reach.

## 2023-10-17 NOTE — PROGRESS NOTES
Pt A&Ox4 on room air, elevated temps overnight. Tylenol given prn. Still continuing to have multiple loose bowel movements. No complaints of pain. Pt removed IV  by accident, accessed port. IV abx, oral vanco given. Call light within reach, frequent checks made, needs met.

## 2023-10-18 NOTE — CM/SW NOTE
MASSIMO noted consult order was placed for Dificid price check however there is no script entered in order to check cost. MASSIMO messaged [facility] ID to request script be placed if needed at DC. Patient has a Medicare plan which most likely will disqualify him from any coupons associated with Dificid.     3:52PM: MASSIMO noted that script was placed and sent script to [employer] to check cost. MASSIMO is awaiting call back. MASSIMO will continue to follow for plan of care changes and remain available for any additional DC needs or concerns.      April YUSUF, [facility]  Discharge Planner   [id number]

## 2023-10-18 NOTE — CDS QUERY
Frankie Denton  Dear Dr. Saranya Sharpe,  Clinical information (provided below) includes documentation of Severe Malnutrition by the Clinical Dietician. PLEASE INDICATE IF YOU ARE IN AGREEMENT WITH THE FOLLOWING   ASSESSMENT BY THE CLINICAL DIETICIAN:  Pt meets severe malnutrition criteria. [   x] Yes, I agree with this assessment             [   ] No, I do not agree with this assessment,( If not in agreement, please provide your independent assessment ) ____________________________              [   ] Unable to clinically determine    Documentation from the Medical Record:   RISK FACTORS:  Pancreatic Cancer on Chemo, HLD, CKD 3, GERD, Diabetes - C-diff     CLINICAL INDICATORS:   -10/16/23 Dietary Malnutrition note: NUTRITION ASSESSMENT:    Pt meets severe malnutrition criteria.   CRITERIA FOR MALNUTRITION DIAGNOSIS:    Criteria for severe malnutrition diagnosis: acute illness/injury related to  energy intake less than 50% for greater than 5 days, body fat moderate  depletion, and muscle mass moderate depletion    NUTRITION RELATED PHYSICAL FINDINGS:    1.Body Fat/Muscle Mass: moderate depletion body fat Orbital fat pad and  moderate muscle depletion Temple region, Clavicle region, Shoulder/Acromion  process, Scapula region, Dorsal hand region, Thigh region, and Calf region      TREATMENT: Dietician consult - Recommend Medical Food Supplements - Nutrition education-Vitamin and Mineral Supplements - adding Chewable MVI    For questions regarding this query, please contact Clinical Documentation : Mohsen Mcgowan -[phone]                                                               THIS FORM IS A PERMANENT PART OF THE MEDICAL RECORD

## 2023-10-18 NOTE — PLAN OF CARE
Received pt a/o x4. VSS. Afebrile. Denies pain. IV abx infused per MAR. Up to bathroom w/ multiple loose BM overnight. Isolation precautions maintained. Call light within reach. Safety precautions in place.      Problem: Diabetes/Glucose Control  Goal: Glucose maintained within prescribed range  Description: INTERVENTIONS:  - Monitor Blood Glucose as ordered  - Assess for signs and symptoms of hyperglycemia and hypoglycemia  - Administer ordered medications to maintain glucose within target range  - Assess barriers to adequate nutritional intake and initiate nutrition consult as needed  - Instruct patient on self management of diabetes  Outcome: Progressing

## 2023-10-18 NOTE — PROGRESS NOTES
[facility] Progress [facility]   Length of Stay: 3      Subjective:   -Started on Dificid. Diarrhea about the same. No longer with fevers. WBC recovered     Oncology History  -He initially noticed 8 weeks of upper abdominal pain with decreased appetite. Associated weight loss of 20 lb. 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 No FH of cancer. No smoking or ETOH abuse. He is here with his wife Alexis Chinchilla. They have one daughter. -CT AP at [facility] on 8/2/23: 1. Large hypodense mass at the pancreatic tail, with suspected direct invasion of the wall of the proximal gastric body, the splenic flexure of the colon, and possibly the splenic hilum. 2.  Hepatic metastases. 3.  Hypodense lesions in the spleen, suspicious for metastatic disease. 4.  Pulmonary nodules. Metastatic disease cannot be excluded. 5.  Small nonobstructing left renal calculus.      -Liver biopsy at [facility] on 8/15/23: On immunohistochemistry, the tumor cells are positive for CK7 and CK20. They show focal weak positivity with CDX2. They are negative for SATB2, TTF-1, NKX 3 and PAX8. The histologic findings are consistent with adenocarcinoma. The immunohistochemical profile is not entirely specific, but could be compatible with the clinical impression of metastatic pancreatic adenocarcinoma. Clinical and imaging correlation is suggested. -CT chest from 9/8/23: non specific lung nodules in LLL, < 5 mm     -[place]/abraxane  C1: 9/6/23:  76. Day 15 omitted due to cytopenias  C2: 10/4/23: : 61    -He was admitted on 10/15/23 for C diff and generalized weakness. G-CSF started. Also on diflucan for yeast in urine.       ROS: 12 Point ROS completed and pertinent positives are above     Objective:    potassium chloride  40 mEq Oral Q4H    fidaxomicin  200 mg Oral BID    heparin  5,000 Units Subcutaneous Q8H [address]    atorvastatin  20 mg Oral Nightly    insulin aspart  1-5 Units Subcutaneous TID AC and HS filgrastim-aafi  480 mcg Subcutaneous Daily    cefepime  1 g Intravenous Q8H    fluconazole  200 mg Oral Q24H     polyethylene glycol (PEG 3350), sennosides, bisacodyl, fleet enema, ondansetron, metoclopramide, acetaminophen **OR** HYDROcodone-acetaminophen **OR** HYDROcodone-acetaminophen, dicyclomine, glucose **OR** glucose **OR** glucose-vitamin C **OR** dextrose **OR** glucose **OR** glucose **OR** glucose-vitamin C    Physical Exam   10/18/23  0808   BP:    Pulse: 95   Resp:    Temp:      General: NAD, AOX3  HEENT: clear op, mmm, no jvd, no scleral icterus   LN: no supraclavicular, infraclavicular, axillary or inguinal LAD  CV: RRR S1S2 no murmurs, no edema  Lungs: CTAB, no increased work of breathing  Abd: soft nt nd +BS no hepatosplenomegaly  Neuro: CN: II-XII grossly intact    Labs/Imaging/Path:  Lab Results   Component Value Date    WBC 5.5 10/18/2023    HGB 9.6 (L) 10/18/2023    HCT 26.7 (L) 10/18/2023    MCV 93.0 10/18/2023    PLT 78.0 (L) 10/18/2023       Recent Labs   Lab 10/14/23  2244 10/16/23  0630 10/17/23  0639 10/18/23  0546   * 112*  --  114*   BUN 22* 18  --  24*   CREATSERUM 1.12 0.93  --  1.27   CA 7.7* 7.4*  --  7.5*   ALB 2.5* 2.0*  --   --     140  --  139   K 3.6 3.1* 3.1* 3.0*  3.0*    113*  --  109   CO2 24.0 18.0*  --  21.0   ALKPHO 99 75  --   --    AST 34 23  --   --    ALT 42 37  --   --    BILT 1.3 1.2  --   --    TP 6.3* 5.2*  --   --        No results found for: \"IGG\"    Imaging: Reviewed     Assessment and Plan:   Metastatic Pancreatic Cancer C2D8 of [place]/abraxane was given on 10/11/23. Will need dose reductions (reduce abraxane to 100 mg/m2 or consideration of holding abraxane) and G-CSF for future cycles. Neutropenia: G-CSF day 4. Continue until [address] >1000 for 48 hours ideally. Will stop G-CSF tomorrow    C diff: On PO Vanc/Dificid, ID following     Poor PO intake: IVF, likely from above.      Ton Duncan MD  THE [facility] On[facility]

## 2023-10-18 NOTE — PLAN OF CARE
Pt A/O x4. VSS. Afebrile. Pt denies pain throughout day. Medications admin per MAR. Pt's potassium 3.0 this AM, replaced per protocol. Pt positive for c. Diff, contact iso precautions maintained. Pt reported increased frequency of stools overnight, MD notified. Pt reports fewer stools today compared to last night. Pt ambulated safely in room. Fall and safety precautions in place. Call light within reach.

## 2023-10-19 NOTE — CM/SW NOTE
MASSIMO was notified by [employer] that [employer] is out of network with [employer] and they are unable to run cost for patient's Dificid. MASSIMO sent copy of script to patient's OP pharmacy at [facility]. SW is awaiting a call back from pharmacy with cost.     SW will continue to follow for plan of care changes and remain available for any additional DC needs or concerns.      Meghan Balderas MSW, [facility]  Discharge Planner   [id number]

## 2023-10-19 NOTE — PLAN OF CARE
Received pt a/o x4. VSS. Afebrile. Denies pain. IV abx infused per MAR. Fewer stools overnight. Tolerating diet. Isolation precautions maintained. Call light within reach. Safety precautions in place. [id number]: Notified ID of changes in WBC & ANC.     Problem: GASTROINTESTINAL - ADULT  Goal: Maintains or returns to baseline bowel function  Description: INTERVENTIONS:  - Assess bowel function  - Maintain adequate hydration with IV or PO as ordered and tolerated  - Evaluate effectiveness of GI medications  - Encourage mobilization and activity  - Obtain nutritional consult as needed  - Establish a toileting routine/schedule  - Consider collaborating with pharmacy to review patient's medication profile  Outcome: Progressing

## 2023-10-19 NOTE — PLAN OF CARE
Pt A&Ox4. Denies pain. Ambulates independently. 4 loose stools today as of 1800, C-diff antibiotics administered. Isolation precautions in place and pt and family educated on effective hygiene practices. WBCs and and ANC higher today, neutropenic precautions discontinued. Replaced potassium, evening blood draw results pending.     Problem: Diabetes/Glucose Control  Goal: Glucose maintained within prescribed range  Description: INTERVENTIONS:  - Monitor Blood Glucose as ordered  - Assess for signs and symptoms of hyperglycemia and hypoglycemia  - Administer ordered medications to maintain glucose within target range  - Assess barriers to adequate nutritional intake and initiate nutrition consult as needed  - Instruct patient on self management of diabetes  Outcome: Progressing

## 2023-10-20 NOTE — PLAN OF CARE
Pt A&Ox4. Occasional abdominal pain relieved by Tylenol. 1 loose stool during day shift. Antibiotics administered. Dificid will be changed to Vanco for discharge home. C-diff isolation precautions in place and pt reminded of effective hygiene practices. Potassium replaced with tablets. Fall and safety precautions in place. Potential discharge tomorrow pending clinical course. Fall and safety precautions in place.      Problem: Diabetes/Glucose Control  Goal: Glucose maintained within prescribed range  Description: INTERVENTIONS:  - Monitor Blood Glucose as ordered  - Assess for signs and symptoms of hyperglycemia and hypoglycemia  - Administer ordered medications to maintain glucose within target range  - Assess barriers to adequate nutritional intake and initiate nutrition consult as needed  - Instruct patient on self management of diabetes  10/20/2023 1537 by Edward Roach RN  Outcome: Progressing  10/20/2023 1537 by Edward Roach RN  Outcome: Progressing     Problem: GASTROINTESTINAL - ADULT  Goal: Maintains or returns to baseline bowel function  Description: INTERVENTIONS:  - Assess bowel function  - Maintain adequate hydration with IV or PO as ordered and tolerated  - Evaluate effectiveness of GI medications  - Encourage mobilization and activity  - Obtain nutritional consult as needed  - Establish a toileting routine/schedule  - Consider collaborating with pharmacy to review patient's medication profile  10/20/2023 1537 by Edward Roach RN  Outcome: Progressing  10/20/2023 1537 by Edward Roach RN  Outcome: Progressing     Problem: METABOLIC/FLUID AND ELECTROLYTES - ADULT  Goal: Electrolytes maintained within normal limits  Description: INTERVENTIONS:  - Monitor labs and rhythm and assess patient for signs and symptoms of electrolyte imbalances  - Administer electrolyte replacement as ordered  - Monitor response to electrolyte replacements, including rhythm and repeat lab results as appropriate  - Fluid restriction as ordered  - Instruct patient on fluid and nutrition restrictions as appropriate  Outcome: Progressing

## 2023-10-20 NOTE — CM/SW NOTE
SW was able to reach pharmacist at [address] [place] [address] on [employer] to confirm cost for Dificid. With patient's insurance the cost is $1,397 for 20 pills. SW notified ID team to verify if there were other options for DC meds. SW will continue to follow. SW will continue to follow for plan of care changes and remain available for any additional DC needs or concerns.      Barbara Dotson [facility]  Discharge Planner   [id number]

## 2023-10-21 NOTE — PLAN OF CARE
Pt a/o x4. VSS on RA. No c/o pain. Meds per MAR. Pt reports 3x BM for this shift. Per pt BM consistency improving. No acute events overnight. Fall risk protocol followed, call light within reach.      Problem: Diabetes/Glucose Control  Goal: Glucose maintained within prescribed range  Description: INTERVENTIONS:  - Monitor Blood Glucose as ordered  - Assess for signs and symptoms of hyperglycemia and hypoglycemia  - Administer ordered medications to maintain glucose within target range  - Assess barriers to adequate nutritional intake and initiate nutrition consult as needed  - Instruct patient on self management of diabetes  Outcome: Progressing     Problem: GASTROINTESTINAL - ADULT  Goal: Maintains or returns to baseline bowel function  Description: INTERVENTIONS:  - Assess bowel function  - Maintain adequate hydration with IV or PO as ordered and tolerated  - Evaluate effectiveness of GI medications  - Encourage mobilization and activity  - Obtain nutritional consult as needed  - Establish a toileting routine/schedule  - Consider collaborating with pharmacy to review patient's medication profile  Outcome: Progressing     Problem: METABOLIC/FLUID AND ELECTROLYTES - ADULT  Goal: Electrolytes maintained within normal limits  Description: INTERVENTIONS:  - Monitor labs and rhythm and assess patient for signs and symptoms of electrolyte imbalances  - Administer electrolyte replacement as ordered  - Monitor response to electrolyte replacements, including rhythm and repeat lab results as appropriate  - Fluid restriction as ordered  - Instruct patient on fluid and nutrition restrictions as appropriate  Outcome: Progressing

## 2023-10-21 NOTE — PLAN OF CARE
Patient this morning states that he has been having normal stools and does not feel that he needs to stay another day. Dr. Melanie Mohan agreed and patient is going to be discharged home this morning. Education given, belongings are packed. Patient is waiting for his ride at this time.        Problem: Diabetes/Glucose Control  Goal: Glucose maintained within prescribed range  Description: INTERVENTIONS:  - Monitor Blood Glucose as ordered  - Assess for signs and symptoms of hyperglycemia and hypoglycemia  - Administer ordered medications to maintain glucose within target range  - Assess barriers to adequate nutritional intake and initiate nutrition consult as needed  - Instruct patient on self management of diabetes  Outcome: Adequate for Discharge     Problem: Patient/Family Goals  Goal: Patient/Family Long Term Goal  Description: Patient's Long Term Goal: go home    Interventions:  - follow plan of care  - See additional Care Plan goals for specific interventions  Outcome: Adequate for Discharge  Goal: Patient/Family Short Term Goal  Description: Patient's Short Term Goal: get stronger    Interventions:   - work with therapy  - See additional Care Plan goals for specific interventions  Outcome: Adequate for Discharge     Problem: GASTROINTESTINAL - ADULT  Goal: Maintains or returns to baseline bowel function  Description: INTERVENTIONS:  - Assess bowel function  - Maintain adequate hydration with IV or PO as ordered and tolerated  - Evaluate effectiveness of GI medications  - Encourage mobilization and activity  - Obtain nutritional consult as needed  - Establish a toileting routine/schedule  - Consider collaborating with pharmacy to review patient's medication profile  Outcome: Adequate for Discharge     Problem: METABOLIC/FLUID AND ELECTROLYTES - ADULT  Goal: Electrolytes maintained within normal limits  Description: INTERVENTIONS:  - Monitor labs and rhythm and assess patient for signs and symptoms of electrolyte imbalances  - Administer electrolyte replacement as ordered  - Monitor response to electrolyte replacements, including rhythm and repeat lab results as appropriate  - Fluid restriction as ordered  - Instruct patient on fluid and nutrition restrictions as appropriate  Outcome: Adequate for Discharge

## 2023-10-21 NOTE — PROGRESS NOTES
NURSING DISCHARGE NOTE    Discharged Home via Wheelchair. Accompanied by Family member  Belongings Taken by patient/family. Instructions/education given. No further questions.

## 2023-10-23 PROBLEM — C25.9 MALIGNANT NEOPLASM OF PANCREAS, UNSPECIFIED LOCATION OF MALIGNANCY (HCC): Status: ACTIVE | Noted: 2023-01-01

## 2023-10-23 PROBLEM — E87.20 METABOLIC ACIDEMIA: Status: ACTIVE | Noted: 2023-01-01

## 2023-10-23 PROBLEM — A04.72 CLOSTRIDIUM DIFFICILE COLITIS: Status: ACTIVE | Noted: 2023-01-01

## 2023-10-23 PROBLEM — N18.9 ACUTE RENAL FAILURE SUPERIMPOSED ON CHRONIC KIDNEY DISEASE, UNSPECIFIED ACUTE RENAL FAILURE TYPE, UNSPECIFIED CKD STAGE: Status: ACTIVE | Noted: 2023-01-01

## 2023-10-23 PROBLEM — E16.2 HYPOGLYCEMIA: Status: ACTIVE | Noted: 2023-01-01

## 2023-10-23 PROBLEM — N17.9 ACUTE RENAL FAILURE SUPERIMPOSED ON CHRONIC KIDNEY DISEASE, UNSPECIFIED ACUTE RENAL FAILURE TYPE, UNSPECIFIED CKD STAGE: Status: ACTIVE | Noted: 2023-01-01

## 2023-10-23 NOTE — ED QUICK NOTES
Pt family at the call light reporting pt is experiencing abd pain. Family reports he takes 2 norco since yesterday due to abd pain worsening. Pt reports 2 norco helped yesterday. ED MD notified.

## 2023-10-23 NOTE — PLAN OF CARE
NURSING ADMISSION NOTE      Patient admitted via Cart with family at bedside  Oriented to room. Safety precautions initiated. Bed in low position. Call light in reach. Patient is alert and oriented x4, slow to respond. On 3-5L nasal cannula, tachypnea, labored breathing with accessory muscles. Glucose 66, dextrose per MAR. Recheck 128. Hypotensive, see vital signs flowsheet - MD Escobar at bedside. Patient with nonsensical speech, MD at bedside completed assessment. Transfer orders placed, report called to KEVYN Hutchins. Family members updated by charge RN.

## 2023-10-23 NOTE — ED INITIAL ASSESSMENT (HPI)
Pt to ED via EMS c/o weakness since discharge from hospital on Saturday, pt was hospitalized due to cdiff, hx pancreatic CA that has spread. Per EMS pt was 88% RA, was placed on NC. Pt A&Ox4 but slower to respond since discharge.

## 2023-10-23 NOTE — PROGRESS NOTES
Received pt from floor around 1600. Alert, slow to respond and speech slurred at times. Placed on BIPAP upon arrival to unit but removed for pt comfort - family at bedside, awaiting  visit before proceeding with comfort care. Levo titrated per MAR. Pt resting comfortably at this time, denies pain. See flowsheets and MAR for further data. 80- wife and son at bedside ready to proceed with comfort care. Levophed and bicarb gtt turned off at 1937. Pt c/o pain, PRN Dilaudid given.

## 2023-10-23 NOTE — ED QUICK NOTES
Orders for admission, patient is aware of plan and ready to go upstairs. Any questions, please call ED RN Deb Astudillo at extension [phone].      Patient Covid vaccination status: Fully vaccinated     COVID Test Ordered in ED: Rapid SARS-CoV-2 by PCR    COVID Suspicion at Admission: N/A    Running Infusions: sodium chloride 0.9% bolus 2, 721     Mental Status/LOC at time of transport: A&Ox4    Other pertinent information: 3L/min  CIWA score: N/A   NIH score:  N/A

## 2023-10-23 NOTE — PROGRESS NOTES
10/23/23 0484   Clinical Encounter Type   Visited With Health care provider  (Called to indicate patient will be going comfort care and family wants their  to come and pray with patient before this happens. Hospitalist contacted [facility] and a member of their staff will be coming to pray with patient/family.)     Family declined the need for  at this time. Spiritual Care support can be requested via an Epic consult.

## 2023-10-23 NOTE — PROGRESS NOTES
[facility] Pharmacy Note:  Renal Adjustment for piperacillin/tazobactam (Galina Gold)    Earline Closs is a [de-identified]year old patient who has been prescribed piperacillin/tazobactam (ZOSYN) 3.375 g every 8 hrs. The estimated creatinine clearance is 18.4 mL/min (A) (based on SCr of 3.73 mg/dL (H)). The dose has been adjusted to piperacillin/tazobactam (ZOSYN) 3.375 g every 12 hrs per hospital renal dose adjustment protocol for treatment of intra-abdominal infection. Pharmacy will follow and adjust dose as warranted for additional renal function changes.     Thank you,    To [place], [facility]  10/23/2023  2:15 PM

## 2023-10-24 LAB
ATRIAL RATE: 90 BPM
P AXIS: 41 DEGREES
P-R INTERVAL: 166 MS
Q-T INTERVAL: 436 MS
QRS DURATION: 100 MS
QTC CALCULATION (BEZET): 533 MS
R AXIS: 30 DEGREES
T AXIS: 17 DEGREES
VENTRICULAR RATE: 90 BPM

## 2023-10-24 NOTE — PLAN OF CARE
Received pt on comfort care. RN called APRN to ensure comfort care orders in place. Family at bedside and updated. Pt  at . [address] [facility] notified- not a candidate.  called and notified- ok to release body. See flowsheet.

## 2023-10-24 NOTE — CDS QUERY
Clinical Documentation Clarification    Dear Dr. Gerard Ye,   Based on your judgment and the below clinical indicators, can you please clarify the patient's respiratory status? (   ) Hypoxia without acute hypoxic respiratory failure  ( x ) Hypoxia with acute hypoxic respiratory failure  (   ) Other - please specify:  _____________________________________________________________________________________  Risk factors/Clinical indicators:   [de-identified] yr old male w/ metastatic pancreatic cancer who has been receiving active chemotherapy treatment, presented to ED with weakness, poor appetite with very poor oral intake. He had recently been admitted from 10/15 to 10/21 for C diff, generalized weakness and was started on G_CSF, as well as Diflucan for yeast in his urine. Initial VS: T 96.7, BP 87/66, P 103, R 26, O2 sat 97% on 3L per nasal cannula  ED Physician Note: Respiratory distress present. Tachypneic but lungs are clear overall. Pulmonology Consultant: O2 3L NC  Dyspnea/Hypoxia  -dyspnea/tachypnea due to resp compensation for met acidosis  -hypoxia due to atelectasis  -wean O2 as tolerated   Lactic Acidosis  -suspect dehydration and worsening c.diff infection   Transferred to ICU On BiPap and levophed & Bicarb gtts. Later switched back to O2 3L nasal cannula for patient comfort. Family opted for comfort care and patient  same day at . ABGs:    10/23 08:58                      15:47            pH  7.10                      pH 7.03                       pCO2  17                    pCO2 25                       pO2  81                       pO2 66                       HCO3  7.4                  HCO3  7.2                       BE  -22.4                    BE  -22.6                          both on 3L nasal cannula   Treatment: Supplemental O2 as above. Bicarb gtt.     For questions regarding this query, please contact Clinical Documentation :   Anne Arellano RN        Cell# [phone]                          Thank you!

## 2023-10-25 ENCOUNTER — APPOINTMENT (OUTPATIENT)
Dept: HEMATOLOGY/ONCOLOGY | Facility: HOSPITAL | Age: 78
End: 2023-10-25
Attending: CLINICAL NURSE SPECIALIST
Payer: MEDICARE

## 2023-10-26 NOTE — DISCHARGE SUMMARY
[address] [facility] Internal Medicine Hospitalist Discharge Summary     Patient ID:  Pete Escalera  [de-identified]year old  1945    Admit date: 10/23/2023    Discharge date and time: 10/23/2023 11:23 PM     Attending Physician: Kyle Stauffer DO    Primary Care Physician: Misha Duong MD     Discharge Diagnoses:   Acute kidney injury   Lactic acidosis   C-diff infection   Metastatic pancreatic cancer   Acute hypoxic respiratory failure     Please note that only [facility] DMG and EMG patients enrolled in the Medicare ACO, [facility] ACO and [address] [place] [address] will be handled by the [address] [place], [place] [address]S [facility] Management team.  For all other patients, please follow usual protocol for discharge care transition. Discharge Condition:      Disposition:       Important Follow up:  - PCP within N/A  - Consults: Hem/onc, renal, pulmCC    Follow Up Items:  N/A      Hospital Course:      [de-identified] yr old male with PMH sig for pancreatic cancer on chemotherapy (last dose on 10/11), hyperlipidemia, CKD 3, GERD, and DM II who presented to the ED for evaluation of weakness and decrease responsiveness. He was found to be in HIRAL with acute respiratory acidosis, was being treated for c-diff infection. Despite IVFs and empiric IV abx, and po vanco, he developed worsening hypotension and respiratory distress. Thus, he was transferred to the ICU for started on pressors. He had worsening acidosis despite bicarb gtt. CRRT was discussed with the pt's family, they ultimately decided on comfort measures and the pt passed shortly thereafter. Consults: IP CONSULT TO PULMONOLOGY  IP CONSULT TO NEPHROLOGY  IP CONSULT TO ONCOLOGY    Operative Procedures:  None      Patient instructions:      I as the attending physician reconciled the current and discharge medications on day of discharge.      N/A    Activity:  NA  Diet:  N/A  Wound Care: as directed  Code Status: DNAR/Comfort Care      Exam on day of discharge: 10/23/23  2032   BP:    Pulse: (!) 0   Resp: (!) 0   Temp:        [address] and Care Hospitalist

## 2023-11-01 ENCOUNTER — APPOINTMENT (OUTPATIENT)
Dept: HEMATOLOGY/ONCOLOGY | Facility: HOSPITAL | Age: 78
End: 2023-11-01
Attending: CLINICAL NURSE SPECIALIST
Payer: MEDICARE

## 2023-11-03 ENCOUNTER — SOCIAL WORK SERVICES (OUTPATIENT)
Dept: HEMATOLOGY/ONCOLOGY | Facility: HOSPITAL | Age: 78
End: 2023-11-03

## 2023-11-03 NOTE — PROGRESS NOTES
received request from Dr. Jeannie Ganser to reach out to Wife to offer support resources due to patient's passing.  called and spoke to Wife Avel Mccloud to offer condolences and resources. Wife shared that she is surrounded with family and support, and does not think she needs ay other resources at this time, but will call the office if that changes ever in the future. Support continued and Wife is aware to reach out at any time.

## (undated) DEVICE — OCCLUSIVE GAUZE STRIP OVERWRAP,3% BISMUTH TRIBROMOPHENATE IN PETROLATUM BLEND: Brand: XEROFORM

## (undated) DEVICE — STERILE POLYISOPRENE POWDER-FREE SURGICAL GLOVES: Brand: PROTEXIS

## (undated) DEVICE — "MH-438 A/W VLVE F/140 EVIS-140": Brand: AIR/WATER VALVE

## (undated) DEVICE — SOL  0.9% 1000ML

## (undated) DEVICE — STOCKINETTE STER DBL PLY 4X54

## (undated) DEVICE — GOWN,SIRUS,FABRIC-REINFORCED,X-LARGE: Brand: MEDLINE

## (undated) DEVICE — SUT ETHILON 4-0 PS-2 1667H

## (undated) DEVICE — Device

## (undated) DEVICE — 1200CC GUARDIAN II: Brand: GUARDIAN

## (undated) DEVICE — FORCEP BIOPSY RJ4 LG CAP W/ND

## (undated) DEVICE — SLEEVE KENDALL SCD EXPRESS MED

## (undated) DEVICE — Device: Brand: DEFENDO AIR/WATER/SUCTION AND BIOPSY VALVE

## (undated) DEVICE — SPECIMEN CONTAINER,POSITIVE SEAL INDICATOR, OR PACKAGED: Brand: PRECISION

## (undated) DEVICE — DISPOSABLE TOURNIQUET CUFF SINGLE BLADDER, DUAL PORT AND QUICK CONNECT CONNECTOR: Brand: COLOR CUFF

## (undated) DEVICE — UPPER EXTREMITY CDS-LF: Brand: MEDLINE INDUSTRIES, INC.

## (undated) DEVICE — DISPOSABLE BIPOLAR FORCEPS 4" (10.2CM) JEWELERS, STRAIGHT 0.4MM TIP AND 12 FT. (3.6M) CABLE: Brand: KIRWAN

## (undated) DEVICE — ENDOSCOPY PACK - LOWER: Brand: MEDLINE INDUSTRIES, INC.

## (undated) DEVICE — TUBING CYSTO

## (undated) DEVICE — SOL NACL IRRIG 0.9% 1000ML BTL

## (undated) DEVICE — PADDING CAST WYTEX 2" STER

## (undated) DEVICE — FILTERLINE NASAL ADULT O2/CO2

## (undated) NOTE — IP AVS SNAPSHOT
[facility] [place] [address] [place], [address] ~ [phone]                Discharge Summary   5/3/2017    Tina Fontenot           Admission Information        Provider Department    5/3/2017 Martin Hernandez MD  Endoscopy      [place] **If unable to reach your doctor, please go to the [facility] Emergency Room**    - Your referring physician will receive a full report of your examination.  - If you do not hear from your doctor's office within two weeks of your biopsy, please call 5/3/2017  7:42 AM  Endoscopy [phone]         We want to hear from you       We want to hear from you, please share your experience with us by returning the survey you will receive in the mail. Thank you!         MyChart     Visit [facility]  You can a